# Patient Record
Sex: FEMALE | Race: WHITE | Employment: OTHER | URBAN - METROPOLITAN AREA
[De-identification: names, ages, dates, MRNs, and addresses within clinical notes are randomized per-mention and may not be internally consistent; named-entity substitution may affect disease eponyms.]

---

## 2018-09-03 ENCOUNTER — APPOINTMENT (OUTPATIENT)
Dept: GENERAL RADIOLOGY | Age: 68
DRG: 481 | End: 2018-09-03
Attending: EMERGENCY MEDICINE
Payer: MEDICARE

## 2018-09-03 ENCOUNTER — HOSPITAL ENCOUNTER (INPATIENT)
Age: 68
LOS: 3 days | Discharge: NURSING, REHAB OR LONG-TERM CARE | DRG: 481 | End: 2018-09-06
Attending: EMERGENCY MEDICINE | Admitting: ORTHOPAEDIC SURGERY
Payer: MEDICARE

## 2018-09-03 DIAGNOSIS — S72.322A CLOSED DISPLACED TRANSVERSE FRACTURE OF SHAFT OF LEFT FEMUR, INITIAL ENCOUNTER (HCC): Primary | ICD-10-CM

## 2018-09-03 PROBLEM — M81.0 OSTEOPOROSIS: Chronic | Status: ACTIVE | Noted: 2018-09-03

## 2018-09-03 PROBLEM — J45.909 ASTHMA: Chronic | Status: ACTIVE | Noted: 2018-09-03

## 2018-09-03 PROBLEM — E78.5 HYPERLIPIDEMIA: Chronic | Status: ACTIVE | Noted: 2018-09-03

## 2018-09-03 PROBLEM — S72.309A FRACTURE, FEMUR CLOSED, SHAFT (HCC): Status: ACTIVE | Noted: 2018-09-03

## 2018-09-03 LAB
ABO + RH BLD: NORMAL
ALBUMIN SERPL-MCNC: 3.8 G/DL (ref 3.5–5)
ALBUMIN/GLOB SERPL: 1.2 {RATIO} (ref 1.1–2.2)
ALP SERPL-CCNC: 88 U/L (ref 45–117)
ALT SERPL-CCNC: 28 U/L (ref 12–78)
ANION GAP SERPL CALC-SCNC: 9 MMOL/L (ref 5–15)
APPEARANCE UR: CLEAR
AST SERPL-CCNC: 16 U/L (ref 15–37)
BACTERIA URNS QL MICRO: NEGATIVE /HPF
BASOPHILS # BLD: 0 K/UL (ref 0–0.1)
BASOPHILS NFR BLD: 0 % (ref 0–1)
BILIRUB SERPL-MCNC: 0.5 MG/DL (ref 0.2–1)
BILIRUB UR QL: NEGATIVE
BLOOD GROUP ANTIBODIES SERPL: NORMAL
BUN SERPL-MCNC: 9 MG/DL (ref 6–20)
BUN/CREAT SERPL: 12 (ref 12–20)
CALCIUM SERPL-MCNC: 9.1 MG/DL (ref 8.5–10.1)
CHLORIDE SERPL-SCNC: 98 MMOL/L (ref 97–108)
CO2 SERPL-SCNC: 25 MMOL/L (ref 21–32)
COLOR UR: ABNORMAL
CREAT SERPL-MCNC: 0.75 MG/DL (ref 0.55–1.02)
DIFFERENTIAL METHOD BLD: NORMAL
EOSINOPHIL # BLD: 0 K/UL (ref 0–0.4)
EOSINOPHIL NFR BLD: 0 % (ref 0–7)
EPITH CASTS URNS QL MICRO: ABNORMAL /LPF
ERYTHROCYTE [DISTWIDTH] IN BLOOD BY AUTOMATED COUNT: 12.9 % (ref 11.5–14.5)
GLOBULIN SER CALC-MCNC: 3.1 G/DL (ref 2–4)
GLUCOSE SERPL-MCNC: 107 MG/DL (ref 65–100)
GLUCOSE UR STRIP.AUTO-MCNC: NEGATIVE MG/DL
HCT VFR BLD AUTO: 36.6 % (ref 35–47)
HGB BLD-MCNC: 12.5 G/DL (ref 11.5–16)
HGB UR QL STRIP: ABNORMAL
IMM GRANULOCYTES # BLD: 0 K/UL (ref 0–0.04)
IMM GRANULOCYTES NFR BLD AUTO: 0 % (ref 0–0.5)
INR PPP: 1 (ref 0.9–1.1)
KETONES UR QL STRIP.AUTO: ABNORMAL MG/DL
LEUKOCYTE ESTERASE UR QL STRIP.AUTO: NEGATIVE
LYMPHOCYTES # BLD: 1.1 K/UL (ref 0.8–3.5)
LYMPHOCYTES NFR BLD: 15 % (ref 12–49)
MCH RBC QN AUTO: 30.9 PG (ref 26–34)
MCHC RBC AUTO-ENTMCNC: 34.2 G/DL (ref 30–36.5)
MCV RBC AUTO: 90.6 FL (ref 80–99)
MONOCYTES # BLD: 0.7 K/UL (ref 0–1)
MONOCYTES NFR BLD: 9 % (ref 5–13)
NEUTS SEG # BLD: 5.5 K/UL (ref 1.8–8)
NEUTS SEG NFR BLD: 75 % (ref 32–75)
NITRITE UR QL STRIP.AUTO: NEGATIVE
NRBC # BLD: 0 K/UL (ref 0–0.01)
NRBC BLD-RTO: 0 PER 100 WBC
PH UR STRIP: 7.5 [PH] (ref 5–8)
PLATELET # BLD AUTO: 227 K/UL (ref 150–400)
PMV BLD AUTO: 10.2 FL (ref 8.9–12.9)
POTASSIUM SERPL-SCNC: 3.9 MMOL/L (ref 3.5–5.1)
PROT SERPL-MCNC: 6.9 G/DL (ref 6.4–8.2)
PROT UR STRIP-MCNC: NEGATIVE MG/DL
PROTHROMBIN TIME: 10 SEC (ref 9–11.1)
RBC # BLD AUTO: 4.04 M/UL (ref 3.8–5.2)
RBC #/AREA URNS HPF: ABNORMAL /HPF (ref 0–5)
SODIUM SERPL-SCNC: 132 MMOL/L (ref 136–145)
SP GR UR REFRACTOMETRY: 1.01 (ref 1–1.03)
SPECIMEN EXP DATE BLD: NORMAL
UA: UC IF INDICATED,UAUC: ABNORMAL
UROBILINOGEN UR QL STRIP.AUTO: 0.2 EU/DL (ref 0.2–1)
WBC # BLD AUTO: 7.4 K/UL (ref 3.6–11)
WBC URNS QL MICRO: ABNORMAL /HPF (ref 0–4)

## 2018-09-03 PROCEDURE — 74011250636 HC RX REV CODE- 250/636: Performed by: ORTHOPAEDIC SURGERY

## 2018-09-03 PROCEDURE — 77030005538 HC CATH URETH FOL44 BARD -B

## 2018-09-03 PROCEDURE — 65270000029 HC RM PRIVATE

## 2018-09-03 PROCEDURE — 74011250637 HC RX REV CODE- 250/637: Performed by: PHYSICIAN ASSISTANT

## 2018-09-03 PROCEDURE — 86900 BLOOD TYPING SEROLOGIC ABO: CPT | Performed by: EMERGENCY MEDICINE

## 2018-09-03 PROCEDURE — 99284 EMERGENCY DEPT VISIT MOD MDM: CPT

## 2018-09-03 PROCEDURE — 51702 INSERT TEMP BLADDER CATH: CPT

## 2018-09-03 PROCEDURE — 93005 ELECTROCARDIOGRAM TRACING: CPT

## 2018-09-03 PROCEDURE — 96374 THER/PROPH/DIAG INJ IV PUSH: CPT

## 2018-09-03 PROCEDURE — 74011250636 HC RX REV CODE- 250/636: Performed by: EMERGENCY MEDICINE

## 2018-09-03 PROCEDURE — 77030032490 HC SLV COMPR SCD KNE COVD -B

## 2018-09-03 PROCEDURE — 85025 COMPLETE CBC W/AUTO DIFF WBC: CPT | Performed by: EMERGENCY MEDICINE

## 2018-09-03 PROCEDURE — 77030028907 HC WRP KNEE WO BGS SOLM -B

## 2018-09-03 PROCEDURE — 74011250636 HC RX REV CODE- 250/636: Performed by: PHYSICIAN ASSISTANT

## 2018-09-03 PROCEDURE — 85610 PROTHROMBIN TIME: CPT | Performed by: EMERGENCY MEDICINE

## 2018-09-03 PROCEDURE — 36415 COLL VENOUS BLD VENIPUNCTURE: CPT | Performed by: EMERGENCY MEDICINE

## 2018-09-03 PROCEDURE — 81001 URINALYSIS AUTO W/SCOPE: CPT | Performed by: EMERGENCY MEDICINE

## 2018-09-03 PROCEDURE — 73552 X-RAY EXAM OF FEMUR 2/>: CPT

## 2018-09-03 PROCEDURE — 94761 N-INVAS EAR/PLS OXIMETRY MLT: CPT

## 2018-09-03 PROCEDURE — 96375 TX/PRO/DX INJ NEW DRUG ADDON: CPT

## 2018-09-03 PROCEDURE — 71045 X-RAY EXAM CHEST 1 VIEW: CPT

## 2018-09-03 PROCEDURE — 74011000250 HC RX REV CODE- 250: Performed by: EMERGENCY MEDICINE

## 2018-09-03 PROCEDURE — 80053 COMPREHEN METABOLIC PANEL: CPT | Performed by: EMERGENCY MEDICINE

## 2018-09-03 PROCEDURE — 94760 N-INVAS EAR/PLS OXIMETRY 1: CPT

## 2018-09-03 RX ORDER — CEFAZOLIN SODIUM/WATER 2 G/20 ML
2 SYRINGE (ML) INTRAVENOUS ONCE
Status: COMPLETED | OUTPATIENT
Start: 2018-09-03 | End: 2018-09-04

## 2018-09-03 RX ORDER — FLUTICASONE PROPIONATE 50 MCG
2 SPRAY, SUSPENSION (ML) NASAL DAILY
Status: DISCONTINUED | OUTPATIENT
Start: 2018-09-04 | End: 2018-09-06 | Stop reason: HOSPADM

## 2018-09-03 RX ORDER — ZOLEDRONIC ACID 5 MG/100ML
5 INJECTION, SOLUTION INTRAVENOUS ONCE
COMMUNITY
End: 2021-10-21 | Stop reason: CLARIF

## 2018-09-03 RX ORDER — MORPHINE SULFATE 2 MG/ML
4 INJECTION, SOLUTION INTRAMUSCULAR; INTRAVENOUS
Status: COMPLETED | OUTPATIENT
Start: 2018-09-03 | End: 2018-09-03

## 2018-09-03 RX ORDER — LORAZEPAM 2 MG/ML
1 INJECTION INTRAMUSCULAR
Status: DISCONTINUED | OUTPATIENT
Start: 2018-09-03 | End: 2018-09-06 | Stop reason: HOSPADM

## 2018-09-03 RX ORDER — LANOLIN ALCOHOL/MO/W.PET/CERES
400 CREAM (GRAM) TOPICAL DAILY
COMMUNITY
End: 2022-11-03 | Stop reason: CLARIF

## 2018-09-03 RX ORDER — ACETAMINOPHEN 325 MG/1
650 TABLET ORAL EVERY 6 HOURS
Status: DISCONTINUED | OUTPATIENT
Start: 2018-09-03 | End: 2018-09-04

## 2018-09-03 RX ORDER — HYDROMORPHONE HYDROCHLORIDE 2 MG/ML
.5-1 INJECTION, SOLUTION INTRAMUSCULAR; INTRAVENOUS; SUBCUTANEOUS
Status: DISCONTINUED | OUTPATIENT
Start: 2018-09-03 | End: 2018-09-06 | Stop reason: HOSPADM

## 2018-09-03 RX ORDER — FERROUS SULFATE, DRIED 160(50) MG
1 TABLET, EXTENDED RELEASE ORAL DAILY
Status: DISCONTINUED | OUTPATIENT
Start: 2018-09-04 | End: 2018-09-04 | Stop reason: SDUPTHER

## 2018-09-03 RX ORDER — PRAVASTATIN SODIUM 40 MG/1
40 TABLET ORAL
Status: DISCONTINUED | OUTPATIENT
Start: 2018-09-03 | End: 2018-09-06 | Stop reason: HOSPADM

## 2018-09-03 RX ORDER — NALOXONE HYDROCHLORIDE 0.4 MG/ML
0.4 INJECTION, SOLUTION INTRAMUSCULAR; INTRAVENOUS; SUBCUTANEOUS AS NEEDED
Status: DISCONTINUED | OUTPATIENT
Start: 2018-09-03 | End: 2018-09-04 | Stop reason: SDUPTHER

## 2018-09-03 RX ORDER — KETAMINE HYDROCHLORIDE 10 MG/ML
0.2 INJECTION, SOLUTION INTRAMUSCULAR; INTRAVENOUS ONCE
Status: COMPLETED | OUTPATIENT
Start: 2018-09-03 | End: 2018-09-03

## 2018-09-03 RX ORDER — HEPARIN SODIUM 5000 [USP'U]/ML
5000 INJECTION, SOLUTION INTRAVENOUS; SUBCUTANEOUS ONCE
Status: COMPLETED | OUTPATIENT
Start: 2018-09-03 | End: 2018-09-03

## 2018-09-03 RX ORDER — IBUPROFEN 400 MG/1
400 TABLET ORAL EVERY 12 HOURS
COMMUNITY
End: 2021-10-21 | Stop reason: CLARIF

## 2018-09-03 RX ORDER — HYDRALAZINE HYDROCHLORIDE 20 MG/ML
10 INJECTION INTRAMUSCULAR; INTRAVENOUS
Status: DISCONTINUED | OUTPATIENT
Start: 2018-09-03 | End: 2018-09-06 | Stop reason: HOSPADM

## 2018-09-03 RX ORDER — ONDANSETRON 2 MG/ML
4 INJECTION INTRAMUSCULAR; INTRAVENOUS ONCE
Status: DISPENSED | OUTPATIENT
Start: 2018-09-03 | End: 2018-09-04

## 2018-09-03 RX ORDER — MONTELUKAST SODIUM 10 MG/1
10 TABLET ORAL DAILY
Status: DISCONTINUED | OUTPATIENT
Start: 2018-09-04 | End: 2018-09-06 | Stop reason: HOSPADM

## 2018-09-03 RX ORDER — MELATONIN
1000 DAILY
Status: DISCONTINUED | OUTPATIENT
Start: 2018-09-04 | End: 2018-09-06 | Stop reason: HOSPADM

## 2018-09-03 RX ORDER — SODIUM CHLORIDE 0.9 % (FLUSH) 0.9 %
5-10 SYRINGE (ML) INJECTION AS NEEDED
Status: DISCONTINUED | OUTPATIENT
Start: 2018-09-03 | End: 2018-09-05

## 2018-09-03 RX ORDER — FLUTICASONE FUROATE AND VILANTEROL 100; 25 UG/1; UG/1
1 POWDER RESPIRATORY (INHALATION) DAILY
Status: DISCONTINUED | OUTPATIENT
Start: 2018-09-04 | End: 2018-09-06 | Stop reason: HOSPADM

## 2018-09-03 RX ORDER — FLUTICASONE FUROATE AND VILANTEROL 100; 25 UG/1; UG/1
1 POWDER RESPIRATORY (INHALATION) DAILY
COMMUNITY

## 2018-09-03 RX ORDER — HYDROMORPHONE HYDROCHLORIDE 1 MG/ML
.5-1 INJECTION, SOLUTION INTRAMUSCULAR; INTRAVENOUS; SUBCUTANEOUS
Status: DISCONTINUED | OUTPATIENT
Start: 2018-09-03 | End: 2018-09-03

## 2018-09-03 RX ORDER — SODIUM CHLORIDE 9 MG/ML
75 INJECTION, SOLUTION INTRAVENOUS CONTINUOUS
Status: DISCONTINUED | OUTPATIENT
Start: 2018-09-03 | End: 2018-09-04 | Stop reason: SDUPTHER

## 2018-09-03 RX ORDER — AMOXICILLIN 250 MG
2 CAPSULE ORAL 2 TIMES DAILY
Status: DISCONTINUED | OUTPATIENT
Start: 2018-09-03 | End: 2018-09-04 | Stop reason: SDUPTHER

## 2018-09-03 RX ORDER — OXYCODONE HYDROCHLORIDE 5 MG/1
5-10 TABLET ORAL
Status: DISCONTINUED | OUTPATIENT
Start: 2018-09-03 | End: 2018-09-06 | Stop reason: HOSPADM

## 2018-09-03 RX ORDER — SODIUM CHLORIDE 0.9 % (FLUSH) 0.9 %
5-10 SYRINGE (ML) INJECTION EVERY 8 HOURS
Status: DISCONTINUED | OUTPATIENT
Start: 2018-09-03 | End: 2018-09-05

## 2018-09-03 RX ORDER — IPRATROPIUM BROMIDE AND ALBUTEROL SULFATE 2.5; .5 MG/3ML; MG/3ML
3 SOLUTION RESPIRATORY (INHALATION)
Status: DISCONTINUED | OUTPATIENT
Start: 2018-09-03 | End: 2018-09-06 | Stop reason: HOSPADM

## 2018-09-03 RX ADMIN — HEPARIN SODIUM 5000 UNITS: 5000 INJECTION INTRAVENOUS; SUBCUTANEOUS at 20:00

## 2018-09-03 RX ADMIN — ACETAMINOPHEN 650 MG: 325 TABLET ORAL at 18:51

## 2018-09-03 RX ADMIN — PRAVASTATIN SODIUM 40 MG: 40 TABLET ORAL at 20:00

## 2018-09-03 RX ADMIN — MORPHINE SULFATE 4 MG: 2 INJECTION, SOLUTION INTRAMUSCULAR; INTRAVENOUS at 14:56

## 2018-09-03 RX ADMIN — SODIUM CHLORIDE 75 ML/HR: 900 INJECTION, SOLUTION INTRAVENOUS at 18:56

## 2018-09-03 RX ADMIN — KETAMINE HYDROCHLORIDE 17.1 MG: 10 INJECTION, SOLUTION INTRAMUSCULAR; INTRAVENOUS at 16:35

## 2018-09-03 RX ADMIN — SENNOSIDES AND DOCUSATE SODIUM 2 TABLET: 8.6; 5 TABLET ORAL at 18:51

## 2018-09-03 RX ADMIN — Medication 10 ML: at 18:52

## 2018-09-03 RX ADMIN — HYDROMORPHONE HYDROCHLORIDE 1 MG: 2 INJECTION, SOLUTION INTRAMUSCULAR; INTRAVENOUS; SUBCUTANEOUS at 23:09

## 2018-09-03 NOTE — ED NOTES
Transport tech at bedside. Patient condition stable, respiratory status within normal limits, neuro status intact.

## 2018-09-03 NOTE — ROUTINE PROCESS
TRANSFER - OUT REPORT:    Verbal report given to Hernando Florez RN on [place]  being transferred to [address] [place] [address] for routine progression of care       Report consisted of patients Situation, Background, Assessment and   Recommendations(SBAR). Information from the following report(s) SBAR and ED Summary was reviewed with the receiving nurse. Lines:   Peripheral IV 09/03/18 Left Antecubital (Active)   Site Assessment Clean, dry, & intact 9/3/2018  3:09 PM   Phlebitis Assessment 0 9/3/2018  3:09 PM   Infiltration Assessment 0 9/3/2018  3:09 PM   Dressing Status Clean, dry, & intact 9/3/2018  3:09 PM   Dressing Type Transparent 9/3/2018  3:09 PM        Opportunity for questions and clarification was provided.       Patient transported with:   [facility]

## 2018-09-03 NOTE — H&P
ORTHOPAEDIC H&P    Subjective:     Date of Consultation:  September 3, 2018      Mariel Burrows is a 70 y.o. female who is being seen for left femur fracture. Pt reports tripping over a tree root while walking in the yard. Describes terrible pain, deformity, requiring EMS to straighten and put in traction. Pt state she is comfortable in her current position. Ambulates at baseline unassisted. Patient Active Problem List    Diagnosis Date Noted    Fracture, femur closed, shaft ([facility] 09/03/2018     No family history on file. Social History   Substance Use Topics    Smoking status: Never Smoker    Smokeless tobacco: Never Used    Alcohol use No     Past Medical History:   Diagnosis Date    Asthma     Cancer ([facility]     skin      Past Surgical History:   Procedure Laterality Date    COLONOSCOPY N/A 7/26/2016    COLONOSCOPY performed by Julian [facility] at [facility] ENDOSCOPY    HX OTHER SURGICAL      exc skin ca      Prior to Admission medications    Medication Sig Start Date End Date Taking? Authorizing Provider   fluticasone-vilanterol (BREO ELLIPTA) 100-25 mcg/dose inhaler Take 1 Puff by inhalation daily. Yes Karan Rogers MD   zoledronic acid (RECLAST) 5 mg/100 mL pgbk 5 mg by IntraVENous route once. Once a year infusion   Yes Karan Rogers MD   vitamin E, dl,tocopheryl acet, (VITAMIN E ACETATE) 400 unit cap capsule Take 400 Units by mouth daily. Yes Karan Rogers MD   ibuprofen (MOTRIN) 400 mg tablet Take 400 mg by mouth every twelve (12) hours. Yes Karan Rogers MD   montelukast (SINGULAIR) 10 mg tablet Take 10 mg by mouth daily. Yes Historical Provider   simvastatin (ZOCOR) 20 mg tablet Take  by mouth nightly. Yes Historical Provider   Cetirizine 10 mg cap Take  by mouth daily. Yes Historical Provider   mometasone (NASONEX) 50 mcg/actuation nasal spray 2 Sprays daily. Yes Historical Provider   cholecalciferol (VITAMIN D3) 1,000 unit tablet Take  by mouth daily.    Yes Historical Provider multivitamin (ONE A DAY) tablet Take 1 Tab by mouth daily. Yes Historical Provider   calcium-cholecalciferol, d3, 600-125 mg-unit tab Take  by mouth daily. Yes Historical Provider     Current Facility-Administered Medications   Medication Dose Route Frequency    0.9% sodium chloride infusion  75 mL/hr IntraVENous CONTINUOUS    sodium chloride (NS) flush 5-10 mL  5-10 mL IntraVENous Q8H    sodium chloride (NS) flush 5-10 mL  5-10 mL IntraVENous PRN    acetaminophen (TYLENOL) tablet 650 mg  650 mg Oral Q6H    oxyCODONE IR (ROXICODONE) tablet 5-10 mg  5-10 mg Oral Q4H PRN    naloxone (NARCAN) injection 0.4 mg  0.4 mg IntraVENous PRN    ondansetron (ZOFRAN) injection 4 mg  4 mg IntraVENous ONCE    senna-docusate (PERICOLACE) 8.6-50 mg per tablet 2 Tab  2 Tab Oral BID    LORazepam (ATIVAN) injection 1 mg  1 mg IntraVENous Q4H PRN    HYDROmorphone (PF) (DILAUDID) injection 0.5-1 mg  0.5-1 mg IntraVENous Q2H PRN     Current Outpatient Prescriptions   Medication Sig    fluticasone-vilanterol (BREO ELLIPTA) 100-25 mcg/dose inhaler Take 1 Puff by inhalation daily.  zoledronic acid (RECLAST) 5 mg/100 mL pgbk 5 mg by IntraVENous route once. Once a year infusion    vitamin E, dl,tocopheryl acet, (VITAMIN E ACETATE) 400 unit cap capsule Take 400 Units by mouth daily.  ibuprofen (MOTRIN) 400 mg tablet Take 400 mg by mouth every twelve (12) hours.  montelukast (SINGULAIR) 10 mg tablet Take 10 mg by mouth daily.  simvastatin (ZOCOR) 20 mg tablet Take  by mouth nightly.  Cetirizine 10 mg cap Take  by mouth daily.  mometasone (NASONEX) 50 mcg/actuation nasal spray 2 Sprays daily.  cholecalciferol (VITAMIN D3) 1,000 unit tablet Take  by mouth daily.  multivitamin (ONE A DAY) tablet Take 1 Tab by mouth daily.  calcium-cholecalciferol, d3, 600-125 mg-unit tab Take  by mouth daily.       No Known Allergies     Review of Systems:  A comprehensive review of systems was negative except for that written in the HPI. Mental Status: no dementia    Objective:     Patient Vitals for the past 8 hrs:   BP Temp Pulse Resp SpO2 Height Weight   18 1645 173/76 - 86 19 100 % - -   18 1637 161/77 - 80 18 100 % - -   18 1500 170/69 - - - 100 % - -   18 1449 187/64 97.5 °F (36.4 °C) 79 18 100 % 5' 5.5\" (1.664 m) 85.3 kg (188 lb)     Temp (24hrs), Av.5 °F (36.4 °C), Min:97.5 °F (36.4 °C), Max:97.5 °F (36.4 °C)      Gen: Well-developed,  Uncomfortable, obese    HEENT: Pink conjunctivae, hearing intact to voice, moist mucous membranes   Neck: Supple  Resp: No respiratory distress   Card: RRR, palpable distal pulse-equal bilaterally, birsk cap refill all distal digits   Abd: Soft, non-distended  Musc: left thigh with obvious deformity,   Neurovascular exam intact LLE, 5/5 MMT gastroc/ant tib/EHL/FHL, No sign of LE VTE  Skin: No skin breakdown noted. Skin warm, pink, dry  Neuro: Cranial nerves are grossly intact, no focal motor weakness, follows commands appropriately   Psych: Good insight, oriented to person, place and time, alert    Imaging Review: XR FEMUR LT 2 V  COMPARISON: None. FINDINGS: 2 views of the left femur were obtained. Comminuted displaced fracture  of the mid to distal femoral diaphysis with approximately 5 cm of  foreshortening, and apex and medial angulation. Alignment at the left hip joint  is preserved. Alignment at the left knee joint also appears preserved. No  additional fractures are identified. IMPRESSION:  Comminuted displaced acute fracture of the mid to distal femoral  diaphysis with foreshortening and apex medial angulation.     Labs:   Recent Results (from the past 24 hour(s))   CBC WITH AUTOMATED DIFF    Collection Time: 18  2:58 PM   Result Value Ref Range    WBC 7.4 3.6 - 11.0 K/uL    RBC 4.04 3.80 - 5.20 M/uL    HGB 12.5 11.5 - 16.0 g/dL    HCT 36.6 35.0 - 47.0 %    MCV 90.6 80.0 - 99.0 FL    MCH 30.9 26.0 - 34.0 PG    MCHC 34.2 30.0 - 36.5 g/dL    RDW 12.9 11.5 - 14.5 %    PLATELET 646 181 - 913 K/uL    MPV 10.2 8.9 - 12.9 FL    NRBC 0.0 0  WBC    ABSOLUTE NRBC 0.00 0.00 - 0.01 K/uL    NEUTROPHILS 75 32 - 75 %    LYMPHOCYTES 15 12 - 49 %    MONOCYTES 9 5 - 13 %    EOSINOPHILS 0 0 - 7 %    BASOPHILS 0 0 - 1 %    IMMATURE GRANULOCYTES 0 0.0 - 0.5 %    ABS. NEUTROPHILS 5.5 1.8 - 8.0 K/UL    ABS. LYMPHOCYTES 1.1 0.8 - 3.5 K/UL    ABS. MONOCYTES 0.7 0.0 - 1.0 K/UL    ABS. EOSINOPHILS 0.0 0.0 - 0.4 K/UL    ABS. BASOPHILS 0.0 0.0 - 0.1 K/UL    ABS. IMM. GRANS. 0.0 0.00 - 0.04 K/UL    DF AUTOMATED     METABOLIC PANEL, COMPREHENSIVE    Collection Time: 09/03/18  2:58 PM   Result Value Ref Range    Sodium 132 (L) 136 - 145 mmol/L    Potassium 3.9 3.5 - 5.1 mmol/L    Chloride 98 97 - 108 mmol/L    CO2 25 21 - 32 mmol/L    Anion gap 9 5 - 15 mmol/L    Glucose 107 (H) 65 - 100 mg/dL    BUN 9 6 - 20 MG/DL    Creatinine 0.75 0.55 - 1.02 MG/DL    BUN/Creatinine ratio 12 12 - 20      GFR est AA >60 >60 ml/min/1.73m2    GFR est non-AA >60 >60 ml/min/1.73m2    Calcium 9.1 8.5 - 10.1 MG/DL    Bilirubin, total 0.5 0.2 - 1.0 MG/DL    ALT (SGPT) 28 12 - 78 U/L    AST (SGOT) 16 15 - 37 U/L    Alk.  phosphatase 88 45 - 117 U/L    Protein, total 6.9 6.4 - 8.2 g/dL    Albumin 3.8 3.5 - 5.0 g/dL    Globulin 3.1 2.0 - 4.0 g/dL    A-G Ratio 1.2 1.1 - 2.2     PROTHROMBIN TIME + INR    Collection Time: 09/03/18  2:58 PM   Result Value Ref Range    INR 1.0 0.9 - 1.1      Prothrombin time 10.0 9.0 - 11.1 sec   TYPE & SCREEN    Collection Time: 09/03/18  2:58 PM   Result Value Ref Range    Crossmatch Expiration 09/06/2018     ABO/Rh(D) Nazia Martínez POSITIVE     Antibody screen NEG    EKG, 12 LEAD, INITIAL    Collection Time: 09/03/18  4:30 PM   Result Value Ref Range    Ventricular Rate 77 BPM    Atrial Rate 77 BPM    P-R Interval 168 ms    QRS Duration 86 ms    Q-T Interval 368 ms    QTC Calculation (Bezet) 416 ms    Calculated P Axis 52 degrees    Calculated R Axis 69 degrees    Calculated T Axis 66 degrees    Diagnosis       Normal sinus rhythm  Normal ECG  No previous ECGs available           Impression:     Patient Active Problem List    Diagnosis Date Noted    Fracture, femur closed, shaft ([facility] 09/03/2018     Active Problems:    Fracture, femur closed, shaft ([facility] (9/3/2018)        Plan:     Left femoral shaft fracture  -  Plan for ORIF at the earliest convenience   -  Medicine for Pre-Operative Clearence/ Post-Operative management.     -  DVT Prophylaxis - SCDs  -  D/C planning- SNF/Rehab/HH      Dr. Kaylee Diaz aware and agrees with plan as above.         Alicia You PA-C  [employer]

## 2018-09-03 NOTE — IP AVS SNAPSHOT
[address] [facility] 
[phone] Patient: Soni Judge MRN: [id number] [id number] About your hospitalization You were admitted on:  September 3, 2018 You last received care in the:  [facility] You were discharged on:  September 6, 2018 Why you were hospitalized Your primary diagnosis was:  Not on File Your diagnoses also included:  Fracture, Femur Closed, Shaft (Hcc), Hyperlipidemia, Osteoporosis, Asthma Follow-up Information Follow up With Details Comments Contact Info [address]  This is your post acute care provider  [address] [facility] [address] [facility] 
[phone] Ethan Casillas MD   [address] [address] 
[phone] Discharge Orders None A check ciaran indicates which time of day the medication should be taken. My Medications START taking these medications Instructions Each Dose to Equal  
 Morning Noon Evening Bedtime  
 aspirin delayed-release 325 mg tablet Your last dose was: Your next dose is: Take 1 Tab by mouth every twelve (12) hours. 325 mg  
    
   
   
   
  
 oxyCODONE IR 5 mg immediate release tablet Commonly known as:  Keisha Yuliana Your last dose was: Your next dose is: Take 1 Tab by mouth every four (4) hours as needed. Max Daily Amount: 30 mg.  
 5 mg CONTINUE taking these medications Instructions Each Dose to Equal  
 Morning Noon Evening Bedtime BREO ELLIPTA 100-25 mcg/dose inhaler Generic drug:  fluticasone-vilanterol Your last dose was: Your next dose is: Take 1 Puff by inhalation daily. 1 Puff  
    
   
   
   
  
 calcium-cholecalciferol (d3) 600-125 mg-unit Tab Your last dose was: Your next dose is: Take  by mouth daily. Cetirizine 10 mg Cap Your last dose was: Your next dose is: Take  by mouth daily. cholecalciferol 1,000 unit tablet Commonly known as:  VITAMIN D3 Your last dose was: Your next dose is: Take  by mouth daily. ibuprofen 400 mg tablet Commonly known as:  MOTRIN Your last dose was: Your next dose is: Take 400 mg by mouth every twelve (12) hours. 400 mg  
    
   
   
   
  
 mometasone 50 mcg/actuation nasal spray Commonly known as:  Hulon Fu Your last dose was: Your next dose is: 2 Sprays daily. 2 Spray  
    
   
   
   
  
 montelukast 10 mg tablet Commonly known as:  SINGULAIR Your last dose was: Your next dose is: Take 10 mg by mouth daily. 10 mg  
    
   
   
   
  
 multivitamin tablet Commonly known as:  ONE A DAY Your last dose was: Your next dose is: Take 1 Tab by mouth daily. 1 Tab RECLAST 5 mg/100 mL Pgbk Generic drug:  zoledronic acid Your last dose was: Your next dose is:    
   
   
 5 mg by IntraVENous route once. Once a year infusion 5 mg  
    
   
   
   
  
 simvastatin 20 mg tablet Commonly known as:  ZOCOR Your last dose was: Your next dose is: Take  by mouth nightly. vitamin E acetate 400 unit Cap capsule Your last dose was: Your next dose is: Take 400 Units by mouth daily. 400 Units Where to Get Your Medications These medications were sent to [facility], [address]  [address] [place] [address],  [place] [address] Phone:  [phone]  
  aspirin delayed-release 325 mg tablet Information on where to get these meds will be given to you by the nurse or doctor. ! Ask your nurse or doctor about these medications  
  oxyCODONE IR 5 mg immediate release tablet Opioid Education Prescription Opioids: What You Need to Know: 
 
Prescription opioids can be used to help relieve moderate-to-severe pain and are often prescribed following a surgery or injury, or for certain health conditions. These medications can be an important part of treatment but also come with serious risks. Opioids are strong pain medicines. Examples include hydrocodone, oxycodone, fentanyl, and morphine. Heroin is an example of an illegal opioid. It is important to work with your health care provider to make sure you are getting the safest, most effective care. WHAT ARE THE RISKS AND SIDE EFFECTS OF OPIOID USE? Prescription opioids carry serious risks of addiction and overdose, especially with prolonged use. An opioid overdose, often marked by slow breathing, can cause sudden death. The use of prescription opioids can have a number of side effects as well, even when taken as directed. · Tolerance-meaning you might need to take more of a medication for the same pain relief · Physical dependence-meaning you have symptoms of withdrawal when the medication is stopped. Withdrawal symptoms can include nausea, sweating, chills, diarrhea, stomach cramps, and muscle aches. Withdrawal can last up to several weeks, depending on which drug you took and how long you took it. · Increased sensitivity to pain · Constipation · Nausea, vomiting, and dry mouth · Sleepiness and dizziness · Confusion · Depression · Low levels of testosterone that can result in lower sex drive, energy, and strength · Itching and sweating RISKS ARE GREATER WITH:      
· History of drug misuse, substance use disorder, or overdose · Mental health conditions (such as depression or anxiety) · Sleep apnea · Older age (72 years or older) · Pregnancy Avoid alcohol while taking prescription opioids. Also, unless specifically advised by your health care provider, medications to avoid include: · Benzodiazepines (such as Xanax or Valium) · Muscle relaxants (such as Soma or Flexeril) · Hypnotics (such as Ambien or Lunesta) · Other prescription opioids KNOW YOUR OPTIONS Talk to your health care provider about ways to manage your pain that don't involve prescription opioids. Some of these options may actually work better and have fewer risks and side effects. Options may include: 
· Pain relievers such as acetaminophen, ibuprofen, and naproxen · Some medications that are also used for depression or seizures · Physical therapy and exercise · Counseling to help patients learn how to cope better with triggers of pain and stress. · Application of heat or cold compress · Massage therapy · Relaxation techniques Be Informed Make sure you know the name of your medication, how much and how often to take it, and its potential risks & side effects. IF YOU ARE PRESCRIBED OPIOIDS FOR PAIN: 
· Never take opioids in greater amounts or more often than prescribed. Remember the goal is not to be pain-free but to manage your pain at a tolerable level. · Follow up with your primary care provider to: · Work together to create a plan on how to manage your pain. · Talk about ways to help manage your pain that don't involve prescription opioids. · Talk about any and all concerns and side effects. · Help prevent misuse and abuse. · Never sell or share prescription opioids · Help prevent misuse and abuse. · Store prescription opioids in a secure place and out of reach of others (this may include visitors, children, friends, and family).  
· Safely dispose of unused/unwanted prescription opioids: Find your community drug take-back program or your pharmacy mail-back program, or flush them down the toilet, following guidance from the Food and Drug Administration (www.fda.gov/Drugs/ResourcesForYou). · Visit www.cdc.gov/drugoverdose to learn about the risks of opioid abuse and overdose. · If you believe you may be struggling with addiction, tell your health care provider and ask for guidance or call [employer] at 1-026-765-HELP. Discharge Instructions Follow up appointments Call Cesilia Mckinney at [phone] to schedule follow up appt with Dr. William Liu in  10 - 14 days. When to call your Orthopaedic Surgeon: 
-unrelieved pain 
-Signs of infection-if your incision is red; continues to have drainage; drainage has a foul odor or if you have a persistent fever over 101 degrees 
-Signs of a blood clot in your leg-calf pain, tenderness, redness, swelling of lower leg When to call your Primary Care Physician: 
-Concerns about medical conditions such as diabetes, high blood pressure, asthma, congestive heart failure 
-Call if blood sugars are elevated, persistent headache or dizziness, coughing or congestion, constipation or diarrhea, burning with urination, abnormal heart rate When to call [id number] go to the nearest emergency room 
-acute onset of chest pain, shortness of breath, difficulty breathing Activity- toe touch weight bearing Incision Care- keep clean and dry Preventing blood clots- asa 325mg bid Pain management 
-take pain medication as prescribed; decrease the amount you use as your pain lessens 
-avoid alcoholic beverages while taking pain medication 
-Please be aware that many medications contain Tylenol. We do not want you to over medicate so please read the information below as a guide. Do not take more than 4 Grams of Tylenol in a 24 hour period. (There are 1000 milligrams in one Gram) Percocet contains 325 mg of Tylenol per tablet (do not take more than 12 tablets in 24 hours) Lortab contains 500 mg of Tylenol per tablet (do not take more than 8 tablets in 24 hours) Norco contains 325 mg of Tylenol per tablet (do not take more than 12 tablets in 24 hours). -You may place an ice bag on your affected extremity Diet 
-resume usual diet; drink plenty of fluids; eat foods high in fiber 
-you may want to take a stool softener (such as Senokot-S or Colace) to prevent constipation while you are taking pain medication. If constipation occurs, take a laxative (such as Dulcolax tablets, Milk of Magnesia, or a suppository) Introducing [facility]! [facility] introduces [employer] (IMMI) patient portal. Now you can access parts of your medical record, email your doctor's office, and request medication refills online. 1. In your internet browser, go to [email or web address] 2. Click on the First Time User? Click Here link in the Sign In box. You will see the New Member Sign Up page. 3. Enter your [employer] Corpt Access Code exactly as it appears below. You will not need to use this code after youve completed the sign-up process. If you do not sign up before the expiration date, you must request a new code. · [employer] Cor[employer] Access Code: [facility] Expires: 12/2/2018  2:47 PM 
 
4. Enter the last four digits of your Social Security Number (xxxx) and Date of Birth (mm/dd/yyyy) as indicated and click Submit. You will be taken to the next sign-up page. 5. Create a [employer] Corpt ID. This will be your [employer] login ID and cannot be changed, so think of one that is secure and easy to remember. 6. Create a [employer] password. You can change your password at any time. 7. Enter your Password Reset Question and Answer. This can be used at a later time if you forget your password. 8. Enter your e-mail address. You will receive e-mail notification when new information is available in [address] 9. Click Sign Up. You can now view and download portions of your medical record. 10. Click the Download Summary menu link to download a portable copy of your medical information. If you have questions, please visit the Frequently Asked Questions section of the [employer] website. Remember, [employer] is NOT to be used for urgent needs. For medical emergencies, dial 911. Now available from your iPhone and Android! Introducing John Gary As a [facility] patient, I wanted to make you aware of our electronic visit tool called John Gary. [employer] allows you to connect within minutes with a medical provider 24 hours a day, seven days a week via a mobile device or tablet or logging into a secure website from your computer. You can access John Gary from anywhere in the [place]. A virtual visit might be right for you when you have a simple condition and feel like you just dont want to get out of bed, or cant get away from work for an appointment, when your regular [facility] provider is not available (evenings, weekends or holidays), or when youre out of town and need minor care. Electronic visits cost only $49 and if the [employer] provider determines a prescription is needed to treat your condition, one can be electronically transmitted to a nearby pharmacy*. Please take a moment to enroll today if you have not already done so. The enrollment process is free and takes just a few minutes. To enroll, please download the [employer] dennis to your tablet or phone, or visit [email or web address] to enroll on your computer. And, as an [address] [place] [address] patient with a [employer] account, the results of your visits will be scanned into your electronic medical record and your primary care provider will be able to view the scanned results.    
We urge you to continue to see your regular [facility] provider for your ongoing medical care. And while your primary care provider may not be the one available when you seek a John Crainlesleefin virtual visit, the peace of mind you get from getting a real diagnosis real time can be priceless. For more information on John Crainlesleefin, view our Frequently Asked Questions (FAQs) at [email or web address]. Sincerely, 
 
Valentino Milks, MD 
Chief Medical Officer Mirna Love *:  certain medications cannot be prescribed via John Abdirahman Providers Seen During Your Hospitalization Provider Specialty Primary office phone Baltazar Ma MD Emergency Medicine [phone] [facility], [address] [place] [address] Orthopedic Surgery [phone] Your Primary Care Physician (PCP) Primary Care Physician Office Phone Office Fax Grayson Padilla [phone] You are allergic to the following No active allergies Recent Documentation Height Weight BMI OB Status Smoking Status 1.664 m 89.4 kg 32.28 kg/m2 Postmenopausal Never Smoker Emergency Contacts Name Discharge Info Relation Home Work Mobile CastilloSavanna DISCHARGE CAREGIVER [3] Sister [23] [phone] Patient Belongings The following personal items are in your possession at time of discharge: 
  Dental Appliances: None  Visual Aid: Glasses      Home Medications: None   Jewelry: None  Clothing: None    Other Valuables: None  Personal Items Sent to Safe: na 
 
  
  
 Please provide this summary of care documentation to your next provider. Signatures-by signing, you are acknowledging that this After Visit Summary has been reviewed with you and you have received a copy. Patient Signature:  ____________________________________________________________ Date:  ____________________________________________________________  
  
Jennifer Currie  Provider Signature: ____________________________________________________________ Date:  ____________________________________________________________

## 2018-09-03 NOTE — ED NOTES
Assumed care of patient. Bedside shift change report received from [facility] (offgoing nurse) by Daphnie Casillas (oncoming nurse). Given using SBAR, ED summary, MAR and recent results. Pt resting in position of comfort. Pedal pulses marked on bilateral feet.  Pt made aware to be NPO at this time, awaiting for xray

## 2018-09-03 NOTE — ED PROVIDER NOTES
EMERGENCY DEPARTMENT HISTORY AND PHYSICAL EXAM      Date: 9/3/2018  Patient Name: Álvaro Fang    History of Presenting Illness     Chief Complaint   Patient presents with   Miah Kelley     tripped over a root while outside    Leg Injury     left thigh deformity; rescue has pt in traction       History Provided By: Patient and EMS    HPI: Álvaro Fang, 70 y.o. female with PMHx significant for asthma, presents via EMS to the ED with cc of an acute onset, L leg pain PTA today. She denies any associated symptoms. EMS reports the pt was out doing yard work, when she had tripped over a tree root, causing her to fall and land on her L side. They report a gross deformity to her L leg and upon arrival, the family was found supporting her leg. EMS informs of good peripheral sensations and had given the pt 2 doses of 100 mcg fentanyl, which had improved her pain to a 5/10. She denies seeing an orthopedic specialist in the past. Pt denies any known allergies to medication. She denies the use of any anticoagulant. There are no other complaints, changes, or physical findings at this time. PCP: Simone Carter MD    Current Outpatient Prescriptions   Medication Sig Dispense Refill    fluticasone-vilanterol (BREO ELLIPTA) 100-25 mcg/dose inhaler Take 1 Puff by inhalation daily.  zoledronic acid (RECLAST) 5 mg/100 mL pgbk 5 mg by IntraVENous route once. Once a year infusion      vitamin E, dl,tocopheryl acet, (VITAMIN E ACETATE) 400 unit cap capsule Take 400 Units by mouth daily.  ibuprofen (MOTRIN) 400 mg tablet Take 400 mg by mouth every twelve (12) hours.  montelukast (SINGULAIR) 10 mg tablet Take 10 mg by mouth daily.  simvastatin (ZOCOR) 20 mg tablet Take  by mouth nightly.  Cetirizine 10 mg cap Take  by mouth daily.  mometasone (NASONEX) 50 mcg/actuation nasal spray 2 Sprays daily.  cholecalciferol (VITAMIN D3) 1,000 unit tablet Take  by mouth daily.       multivitamin (ONE A DAY) tablet Take 1 Tab by mouth daily.  calcium-cholecalciferol, d3, 600-125 mg-unit tab Take  by mouth daily. Past History     Past Medical History:  Past Medical History:   Diagnosis Date    Asthma     Cancer [facility])     skin       Past Surgical History:  Past Surgical History:   Procedure Laterality Date    COLONOSCOPY N/A 7/26/2016    COLON[facility]COPY performed [facility] Alie [facility]her [facility] at [facility] ENDOSCOPY    HX OTHER SURGICAL      exc skin ca       Family History:  No family history on file. Social History:  Social History   Substance Use Topics    Smoking status: Never Smoker    Smokeless tobacco: Never Used    Alcohol use No       Allergies:  No Known Allergies      Review of Systems   Review of Systems   Constitutional: Negative for chills and fever. Respiratory: Negative for cough and shortness of breath. Cardiovascular: Negative for chest pain. Gastrointestinal: Negative for constipation, diarrhea, nausea and vomiting. Musculoskeletal: Positive for myalgias (+LLE). Neurological: Negative for weakness and numbness. All other systems reviewed and are negative. Physical Exam   Physical Exam   Constitutional: She is oriented to person, place, and time. She appears well-developed and well-nourished. Moderate distress secondary to pain   HENT:   Head: Normocephalic and atraumatic. Eyes: Conjunctivae and EOM are normal.   Neck: Normal range of motion. Neck supple. Cardiovascular: Normal rate and regular rhythm. Pulmonary/Chest: Effort normal and breath sounds normal. No respiratory distress. Abdominal: Soft. She exhibits no distension. There is no tenderness. Musculoskeletal: She exhibits deformity. L leg:  Obvious deformity  Externally rotated and shortened  Significant swelling to the thigh with pain elicited on minimal movement  2+ DP pulses  Able to wiggle toes  Good sensation of foot   Neurological: She is alert and oriented to person, place, and time.    Skin: Skin is warm and dry. Psychiatric: She has a normal mood and affect. Nursing note and vitals reviewed. Diagnostic Study Results     Labs -   Recent Results (from the past 12 hour(s))   CBC WITH AUTOMATED DIFF    Collection Time: 09/03/18  2:58 PM   Result Value Ref Range    WBC 7.4 3.6 - 11.0 K/uL    RBC 4.04 3.80 - 5.20 M/uL    HGB 12.5 11.5 - 16.0 g/dL    HCT 36.6 35.0 - 47.0 %    MCV 90.6 80.0 - 99.0 FL    MCH 30.9 26.0 - 34.0 PG    MCHC 34.2 30.0 - 36.5 g/dL    RDW 12.9 11.5 - 14.5 %    PLATELET 483 421 - 230 K/uL    MPV 10.2 8.9 - 12.9 FL    NRBC 0.0 0  WBC    ABSOLUTE NRBC 0.00 0.00 - 0.01 K/uL    NEUTROPHILS 75 32 - 75 %    LYMPHOCYTES 15 12 - 49 %    MONOCYTES 9 5 - 13 %    EOSINOPHILS 0 0 - 7 %    BASOPHILS 0 0 - 1 %    IMMATURE GRANULOCYTES 0 0.0 - 0.5 %    ABS. NEUTROPHILS 5.5 1.8 - 8.0 K/UL    ABS. LYMPHOCYTES 1.1 0.8 - 3.5 K/UL    ABS. MONOCYTES 0.7 0.0 - 1.0 K/UL    ABS. EOSINOPHILS 0.0 0.0 - 0.4 K/UL    ABS. BASOPHILS 0.0 0.0 - 0.1 K/UL    ABS. IMM. GRANS. 0.0 0.00 - 0.04 K/UL    DF AUTOMATED     METABOLIC PANEL, COMPREHENSIVE    Collection Time: 09/03/18  2:58 PM   Result Value Ref Range    Sodium 132 (L) 136 - 145 mmol/L    Potassium 3.9 3.5 - 5.1 mmol/L    Chloride 98 97 - 108 mmol/L    CO2 25 21 - 32 mmol/L    Anion gap 9 5 - 15 mmol/L    Glucose 107 (H) 65 - 100 mg/dL    BUN 9 6 - 20 MG/DL    Creatinine 0.75 0.55 - 1.02 MG/DL    BUN/Creatinine ratio 12 12 - 20      GFR est AA >60 >60 ml/min/1.73m2    GFR est non-AA >60 >60 ml/min/1.73m2    Calcium 9.1 8.5 - 10.1 MG/DL    Bilirubin, total 0.5 0.2 - 1.0 MG/DL    ALT (SGPT) 28 12 - 78 U/L    AST (SGOT) 16 15 - 37 U/L    Alk.  phosphatase 88 45 - 117 U/L    Protein, total 6.9 6.4 - 8.2 g/dL    Albumin 3.8 3.5 - 5.0 g/dL    Globulin 3.1 2.0 - 4.0 g/dL    A-G Ratio 1.2 1.1 - 2.2     PROTHROMBIN TIME + INR    Collection Time: 09/03/18  2:58 PM   Result Value Ref Range    INR 1.0 0.9 - 1.1      Prothrombin time 10.0 9.0 - 11.1 sec   TYPE & SCREEN Collection Time: 09/03/18  2:58 PM   Result Value Ref Range    Crossmatch Expiration 09/06/2018     ABO/Rh(D) O POSITIVE     Antibody screen NEG    EKG, 12 LEAD, INITIAL    Collection Time: 09/03/18  4:30 PM   Result Value Ref Range    Ventricular Rate 77 BPM    Atrial Rate 77 BPM    P-R Interval 168 ms    QRS Duration 86 ms    Q-T Interval 368 ms    QTC Calculation (Bezet) 416 ms    Calculated P Axis 52 degrees    Calculated R Axis 69 degrees    Calculated T Axis 66 degrees    Diagnosis       Normal sinus rhythm  Normal ECG  No previous ECGs available         Radiologic Studies -   XR FEMUR LT 2 V   Final Result   Initial Result:     Impression:     IMPRESSION:  Comminuted displaced acute fracture of the mid to distal femoral  diaphysis with foreshortening and apex medial angulation.        Narrative:     EXAM:  XR FEMUR LT 2 V    INDICATION:   Trauma    COMPARISON: None. FINDINGS: 2 views of the left femur were obtained. Comminuted displaced fracture  of the mid to distal femoral diaphysis with approximately 5 cm of  foreshortening, and apex and medial angulation. Alignment at the left hip joint  is preserved. Alignment at the left knee joint also appears preserved. No  additional fractures are identified. CXR Results  (Last 48 hours)               09/03/18 1627  XR CHEST SNGL V Final result    Impression:  IMPRESSION: No acute cardiopulmonary process. Narrative:  EXAM:  XR CHEST SNGL V       INDICATION:   Chest Pain       COMPARISON: Chest radiograph 4/27/2016. FINDINGS: AP radiograph of the chest was obtained. No evidence of focal consolidation. No pleural effusion or pneumothorax. Heart,   jolanta, mediastinum are within normal limits. No acute osseous abnormalities. Medical Decision Making   I am the first provider for this patient.     I reviewed the vital signs, available nursing notes, past medical history, past surgical history, family history and social history. Vital Signs-Reviewed the patient's vital signs. Patient Vitals for the past 12 hrs:   Temp Pulse Resp BP SpO2   09/03/18 1637 - 80 18 161/77 100 %   09/03/18 1500 - - - 170/69 100 %   09/03/18 1449 97.5 °F (36.4 °C) 79 18 187/64 100 %     EKG interpretation: (Preliminary): 1630  Rhythm: normal sinus rhythm; and regular . Rate (approx.): 77; Axis: normal; VT interval: normal; QRS interval: normal ; ST/T wave: normal; Other findings: normal.  Written by Frankey Krabbe, ED Scribe, as dictated by Ariana Morton M.D. Records Reviewed: Nursing Notes, Old Medical Records, Ambulance Run Sheet, Previous Radiology Studies and Previous Laboratory Studies    Provider Notes (Medical Decision Making):   Patient presents with L leg pain after trauma. DDx: dislocation, fracture, contusion. Will get analgesics and xrays. Neurovasularly intact. ED Course:   Initial assessment performed. The patients presenting problems have been discussed, and they are in agreement with the care plan formulated and outlined with them. I have encouraged them to ask questions as they arise throughout their visit. PROGRESS NOTE:  3:47 PM  Pt off to XR. CONSULT NOTE:  4:16 PM  Ariana Morton M.D spoke with THOM Chawla and Brittany Ospina MD,  Specialty: Orthopedics  Discussed patient's hx, disposition, and available diagnostic and imaging results. Reviewed care plans. Consultant agrees with plans as outlined. They will see pt and likely admit. Critical Care Time:   0    Disposition:  PLAN:  1. Admit    ADMIT NOTE:  4:21 PM  Patient is being admitted to the hospital by Dr. Brittany Ospina. The results of their tests and reasons for their admission have been discussed with them and/or available family. They convey agreement and understanding for the need to be admitted and for their admission diagnosis. Consultation has been made with the inpatient physician specialist for hospitalization.     Diagnosis     Clinical Impression:   1. Closed displaced transverse fracture of shaft of left femur, initial encounter ([facility] [address]        Attestations: This note is prepared by Radha Astudillo, acting as Scribe for James Herring M.D. James Herring M.D: The scribe's documentation has been prepared under my direction and personally reviewed by me in its entirety. I confirm that the note above accurately reflects all work, treatment, procedures, and medical decision making performed by me. This note will not be viewable in [address]

## 2018-09-03 NOTE — CONSULTS
Hospitalist Consultation Note    NAME:  Flaquito Preciado   :   1950   MRN:   [id number]     ATTENDING: Savanna Hallman DO  PCP:  Meagan Gaxiola MD    Date/Time:  9/3/2018 6:06 PM      Recommendations/Plan:     Active Problems:    Fracture, femur closed, shaft ([address] (9/3/2018)      Hyperlipidemia (9/3/2018)      Osteoporosis (9/3/2018)      Asthma (9/3/2018)       L mid/distal Femur fracture POA  S/p mechanical fall at home  Osteoporosis- severe  H/o long term bisphosphonate therapy >5 yrs aggregate (last cycle since past 3 yrs)  Preop Medical Clearance   Increased BP/accelerated HTN POA- likely due to pain due to above  CXR neg  EKG= NSR,no acute changes  UA neg for infection except blood & ketones  LFTs normal    IVF when NPO p MN  Pain management will help BP control  IV hydralazine prn  Incentive spirometry perioperatively  Pt may need to be taken off Bisphosphonate (Reclast) & consider other therapies for sever osteoporosis- ?Raloxifene versus Hormonal replacement if no contraindications  OP referral to Endocrinology recommended- discussed with patient & family in detail tonight. Cont Ca+ Vit D3 for now  Pre-op cardiac risk assessment:  Pt evaluated using revised cardiac risk index and is felt to be low cardiovascular risk for intermediate risk surgery with a 0.4% risk for major complications based on these criteria. This risk has been discussed with the patient and family and pt wishes to proceed. Plan for surgery without further cardiac testing if this risk is acceptable per surgery and anesthesia. Further risk reduction will involve medical management of other comorbid conditions in the perioperative period.     Hyperlipidemia  Asthma- controlled  Cont statin  Cont home Breo, Singulair    H/o Skin Ca  Family h/o colon ca in mother    Code Status: Full code as per the pt's wishes tonight  Surrogate Decision maker: Jean-Claude Eisenberg # [phone]  DVT Prophylaxis: SQ Heparin x 1 now, SCDs there after till post op - resume Lovenox        Subjective:   REQUESTING PHYSICIAN: Dr Dayna Turcios:  Preop medical clearance, medical management  [place] is a 70 y.o.  female who I was asked to see for preop medical clearance & Medical management in this 71 yo osteoporotic lady with asthma, Hyperlipidemia, h/o skin cancers (BCCa) came in with L femur fracture s/p fall at home (trivial tripping)  Denies having any chest pain, SOB, palpitation, syncope prior or after the fall. H/o taking Reclast for ~5 yrs then off it for few yrs then back on it for past 3 yrs as per pt  H/o Hyperlipidemia- on statin for years- HDL is great, LDL & Trig are normal as per the pt  Asthma has been stable on Breo & Singulair. H/o taking Advil 400mg BID pretty much daily for her arthritis- no h/o GERD symptoms or Blood in Stools/anemia  Last colonoscopy was by Dr Hansa Moy for family h/o Colon ca in mother- negative & rescheduled every 5 yrs. Past Medical History:   Diagnosis Date    Asthma     Cancer [facility])     skin      Past Surgical History:   Procedure Laterality Date    COLONOSCOPY N/A 7/26/2016    COL[facility]res [facility] ENDOSCOPY    HX OTHER SURGICAL      exc skin ca     Social History   Substance Use Topics    Smoking status: Never Smoker    Smokeless tobacco: Never Used    Alcohol use No     Family History   Mother had colon Ca  Heart disease in family as per pt    No Known Allergies   Prior to Admission medications    Medication Sig Start Date End Date Taking? Authorizing Provider   fluticasone-vilanterol (BREO ELLIPTA) 100-25 mcg/dose inhaler Take 1 Puff by inhalation daily. Yes Karan Rogers MD   zoledronic acid (RECLAST) 5 mg/100 mL pgbk 5 mg by IntraVENous route once. Once a year infusion   Yes Karan Rogers MD   vitamin E, dl,tocopheryl acet, (VITAMIN E ACETATE) 400 unit cap capsule Take 400 Units by mouth daily.    Yes Karan Rogers MD   ibuprofen (MOTRIN) 400 mg tablet Take 400 mg by mouth every twelve (12) hours. Yes Phys Other, MD   montelukast (SINGULAIR) 10 mg tablet Take 10 mg by mouth daily. Yes Historical Provider   simvastatin (ZOCOR) 20 mg tablet Take  by mouth nightly. Yes Historical Provider   Cetirizine 10 mg cap Take  by mouth daily. Yes Historical Provider   mometasone (NASONEX) 50 mcg/actuation nasal spray 2 Sprays daily. Yes Historical Provider   cholecalciferol (VITAMIN D3) 1,000 unit tablet Take  by mouth daily. Yes Historical Provider   multivitamin (ONE A DAY) tablet Take 1 Tab by mouth daily. Yes Historical Provider   calcium-cholecalciferol, d3, 600-125 mg-unit tab Take  by mouth daily.    Yes Historical Provider       REVIEW OF SYSTEMS:     Total of 12 systems reviewed as follows:           POSITIVE= underlined text  Negative = text not underlined  General:  fever, chills, sweats, generalized weakness, weight loss/gain,      loss of appetite   Eyes:    blurred vision, eye pain, loss of vision, double vision  ENT:    rhinorrhea, pharyngitis   Respiratory:   cough, sputum production, SOB, wheezing, CHANEY, pleuritic pain   Cardiology:   chest pain, palpitations, orthopnea, PND, edema, syncope   Gastrointestinal:  abdominal pain , N/V, dysphagia, diarrhea, constipation, bleeding   Genitourinary:  frequency, urgency, dysuria, hematuria, incontinence   Muskuloskeletal :  Arthralgia ( L leg ), myalgia   Hematology:  easy bruising, nose or gum bleeding, lymphadenopathy   Dermatological: rash, ulceration, pruritis   Endocrine:   hot flashes or polydipsia   Neurological:  headache, dizziness, confusion, focal weakness, paresthesia,     Speech difficulties, memory loss, gait disturbance  Psychological: Feelings of anxiety, depression, agitation    Objective:   VITALS:    Visit Vitals    /78 (BP 1 Location: Right arm, BP Patient Position: Post activity;Supine)    Pulse 73    Temp 97.5 °F (36.4 °C)    Resp 22    Ht 5' 5.5\" (1.664 m)    Wt 85.3 kg (188 lb)    SpO2 100%    BMI 30.81 kg/m2     Temp (24hrs), Av.8 °F (36.6 °C), Min:97.5 °F (36.4 °C), Max:98.3 °F (36.8 °C)      PHYSICAL EXAM:   General:    Alert, cooperative, no distress, appears stated age. HEENT: Atraumatic, anicteric sclerae, pink conjunctivae     No oral ulcers, mucosa moist, throat clear  Neck:  Supple, symmetrical,  thyroid: non tender  Lungs:   Clear to auscultation bilaterally. No Wheezing or Rhonchi. No rales. Chest wall:  No tenderness  No Accessory muscle use. Heart:   Regular  rhythm,  No  murmur   No edema  Abdomen:   Soft, non-tender. Not distended. Bowel sounds normal  Extremities: No cyanosis. No clubbing, L LE shortened & externally rotated +, tender on any movement  Skin:     Not pale. Not Jaundiced  No rashes   Psych:  Good insight. Not depressed. Not anxious or agitated. Neurologic: EOMs intact. No facial asymmetry. No aphasia or slurred speech. Symmetrical strength, Alert and oriented X 4.     _______________________________________________________________________  Care Plan discussed with:    Comments   Patient x    Family  x Family at bedside including sister/mPOA in room 80   RN x Delta Giron                    Consultant:  x Ortho PA   ____________________________________________________________________  TOTAL TIME:     40 mins    Comments    x Reviewed previous records   >50% of visit spent in counseling and coordination of care x Discussion with patient and family and questions answered       Critical Care Provided     Minutes non procedure based  ________________________________________________________________________  Signed: Debbie Ji MD      Procedures: see electronic medical records for all procedures/Xrays and details which were not copied into this note but were reviewed prior to creation of Plan.     LAB DATA REVIEWED:    Recent Results (from the past 24 hour(s))   CBC WITH AUTOMATED DIFF    Collection Time: 18 2:58 PM   Result Value Ref Range    WBC 7.4 3.6 - 11.0 K/uL    RBC 4.04 3.80 - 5.20 M/uL    HGB 12.5 11.5 - 16.0 g/dL    HCT 36.6 35.0 - 47.0 %    MCV 90.6 80.0 - 99.0 FL    MCH 30.9 26.0 - 34.0 PG    MCHC 34.2 30.0 - 36.5 g/dL    RDW 12.9 11.5 - 14.5 %    PLATELET 790 412 - 465 K/uL    MPV 10.2 8.9 - 12.9 FL    NRBC 0.0 0  WBC    ABSOLUTE NRBC 0.00 0.00 - 0.01 K/uL    NEUTROPHILS 75 32 - 75 %    LYMPHOCYTES 15 12 - 49 %    MONOCYTES 9 5 - 13 %    EOSINOPHILS 0 0 - 7 %    BASOPHILS 0 0 - 1 %    IMMATURE GRANULOCYTES 0 0.0 - 0.5 %    ABS. NEUTROPHILS 5.5 1.8 - 8.0 K/UL    ABS. LYMPHOCYTES 1.1 0.8 - 3.5 K/UL    ABS. MONOCYTES 0.7 0.0 - 1.0 K/UL    ABS. EOSINOPHILS 0.0 0.0 - 0.4 K/UL    ABS. BASOPHILS 0.0 0.0 - 0.1 K/UL    ABS. IMM. GRANS. 0.0 0.00 - 0.04 K/UL    DF AUTOMATED     METABOLIC PANEL, COMPREHENSIVE    Collection Time: 09/03/18  2:58 PM   Result Value Ref Range    Sodium 132 (L) 136 - 145 mmol/L    Potassium 3.9 3.5 - 5.1 mmol/L    Chloride 98 97 - 108 mmol/L    CO2 25 21 - 32 mmol/L    Anion gap 9 5 - 15 mmol/L    Glucose 107 (H) 65 - 100 mg/dL    BUN 9 6 - 20 MG/DL    Creatinine 0.75 0.55 - 1.02 MG/DL    BUN/Creatinine ratio 12 12 - 20      GFR est AA >60 >60 ml/min/1.73m2    GFR est non-AA >60 >60 ml/min/1.73m2    Calcium 9.1 8.5 - 10.1 MG/DL    Bilirubin, total 0.5 0.2 - 1.0 MG/DL    ALT (SGPT) 28 12 - 78 U/L    AST (SGOT) 16 15 - 37 U/L    Alk.  phosphatase 88 45 - 117 U/L    Protein, total 6.9 6.4 - 8.2 g/dL    Albumin 3.8 3.5 - 5.0 g/dL    Globulin 3.1 2.0 - 4.0 g/dL    A-G Ratio 1.2 1.1 - 2.2     PROTHROMBIN TIME + INR    Collection Time: 09/03/18  2:58 PM   Result Value Ref Range    INR 1.0 0.9 - 1.1      Prothrombin time 10.0 9.0 - 11.1 sec   TYPE & SCREEN    Collection Time: 09/03/18  2:58 PM   Result Value Ref Range    Crossmatch Expiration 09/06/2018     ABO/Rh(D) Genesis Osullivan POSITIVE     Antibody screen NEG    EKG, 12 LEAD, INITIAL    Collection Time: 09/03/18  4:30 PM   Result Value Ref Range Ventricular Rate 77 BPM    Atrial Rate 77 BPM    P-R Interval 168 ms    QRS Duration 86 ms    Q-T Interval 368 ms    QTC Calculation (Bezet) 416 ms    Calculated P Axis 52 degrees    Calculated R Axis 69 degrees    Calculated T Axis 66 degrees    Diagnosis       Normal sinus rhythm  Normal ECG  No previous ECGs available     URINALYSIS W/ REFLEX CULTURE    Collection Time: 09/03/18  4:44 PM   Result Value Ref Range    Color YELLOW/STRAW      Appearance CLEAR CLEAR      Specific gravity 1.007 1.003 - 1.030      pH (UA) 7.5 5.0 - 8.0      Protein NEGATIVE  NEG mg/dL    Glucose NEGATIVE  NEG mg/dL    Ketone TRACE (A) NEG mg/dL    Bilirubin NEGATIVE  NEG      Blood TRACE (A) NEG      Urobilinogen 0.2 0.2 - 1.0 EU/dL    Nitrites NEGATIVE  NEG      Leukocyte Esterase NEGATIVE  NEG      WBC 0-4 0 - 4 /hpf    RBC 0-5 0 - 5 /hpf    Epithelial cells FEW FEW /lpf    Bacteria NEGATIVE  NEG /hpf    UA:UC IF INDICATED CULTURE NOT INDICATED BY UA RESULT CNI         _____________________________  Hospitalist: Sergey Jacobs MD

## 2018-09-03 NOTE — ED NOTES
Verbal order for rosas catheter received from Oliver Madhu Narciso TOMAS.  Michelle TOMAS at bedside speaking with patient regarding plan of care for surgery, admission

## 2018-09-04 ENCOUNTER — ANESTHESIA EVENT (OUTPATIENT)
Dept: SURGERY | Age: 68
DRG: 481 | End: 2018-09-04
Payer: MEDICARE

## 2018-09-04 ENCOUNTER — ANESTHESIA (OUTPATIENT)
Dept: SURGERY | Age: 68
DRG: 481 | End: 2018-09-04
Payer: MEDICARE

## 2018-09-04 ENCOUNTER — APPOINTMENT (OUTPATIENT)
Dept: GENERAL RADIOLOGY | Age: 68
DRG: 481 | End: 2018-09-04
Attending: ORTHOPAEDIC SURGERY
Payer: MEDICARE

## 2018-09-04 LAB
ATRIAL RATE: 77 BPM
CALCULATED P AXIS, ECG09: 52 DEGREES
CALCULATED R AXIS, ECG10: 69 DEGREES
CALCULATED T AXIS, ECG11: 66 DEGREES
DIAGNOSIS, 93000: NORMAL
P-R INTERVAL, ECG05: 168 MS
Q-T INTERVAL, ECG07: 368 MS
QRS DURATION, ECG06: 86 MS
QTC CALCULATION (BEZET), ECG08: 416 MS
VENTRICULAR RATE, ECG03: 77 BPM

## 2018-09-04 PROCEDURE — 74011250636 HC RX REV CODE- 250/636: Performed by: ORTHOPAEDIC SURGERY

## 2018-09-04 PROCEDURE — 76060000034 HC ANESTHESIA 1.5 TO 2 HR: Performed by: ORTHOPAEDIC SURGERY

## 2018-09-04 PROCEDURE — 74011250637 HC RX REV CODE- 250/637: Performed by: ORTHOPAEDIC SURGERY

## 2018-09-04 PROCEDURE — 74011250636 HC RX REV CODE- 250/636: Performed by: ANESTHESIOLOGY

## 2018-09-04 PROCEDURE — 77030008467 HC STPLR SKN COVD -B: Performed by: ORTHOPAEDIC SURGERY

## 2018-09-04 PROCEDURE — 74011250636 HC RX REV CODE- 250/636

## 2018-09-04 PROCEDURE — 76001 XR FLUOROSCOPY OVER 60 MINUTES: CPT

## 2018-09-04 PROCEDURE — 77030011640 HC PAD GRND REM COVD -A: Performed by: ORTHOPAEDIC SURGERY

## 2018-09-04 PROCEDURE — 77030026438 HC STYL ET INTUB CARD -A: Performed by: NURSE ANESTHETIST, CERTIFIED REGISTERED

## 2018-09-04 PROCEDURE — 77030032490 HC SLV COMPR SCD KNE COVD -B: Performed by: ORTHOPAEDIC SURGERY

## 2018-09-04 PROCEDURE — 74011000250 HC RX REV CODE- 250

## 2018-09-04 PROCEDURE — 94760 N-INVAS EAR/PLS OXIMETRY 1: CPT

## 2018-09-04 PROCEDURE — 0QS906Z REPOSITION LEFT FEMORAL SHAFT WITH INTRAMEDULLARY INTERNAL FIXATION DEVICE, OPEN APPROACH: ICD-10-PCS | Performed by: ORTHOPAEDIC SURGERY

## 2018-09-04 PROCEDURE — 77030000031 HC BIT DRL QC SYNT -C: Performed by: ORTHOPAEDIC SURGERY

## 2018-09-04 PROCEDURE — C1769 GUIDE WIRE: HCPCS | Performed by: ORTHOPAEDIC SURGERY

## 2018-09-04 PROCEDURE — 77030019908 HC STETH ESOPH SIMS -A: Performed by: NURSE ANESTHETIST, CERTIFIED REGISTERED

## 2018-09-04 PROCEDURE — 74011250637 HC RX REV CODE- 250/637: Performed by: PHYSICIAN ASSISTANT

## 2018-09-04 PROCEDURE — 77030014405 HC GD ROD RMR SYNT -C: Performed by: ORTHOPAEDIC SURGERY

## 2018-09-04 PROCEDURE — 77030016474 HC BIT DRL QC3 SYNT -C: Performed by: ORTHOPAEDIC SURGERY

## 2018-09-04 PROCEDURE — C1713 ANCHOR/SCREW BN/BN,TIS/BN: HCPCS | Performed by: ORTHOPAEDIC SURGERY

## 2018-09-04 PROCEDURE — 77030008684 HC TU ET CUF COVD -B: Performed by: NURSE ANESTHETIST, CERTIFIED REGISTERED

## 2018-09-04 PROCEDURE — 77030020782 HC GWN BAIR PAWS FLX 3M -B

## 2018-09-04 PROCEDURE — 73552 X-RAY EXAM OF FEMUR 2/>: CPT

## 2018-09-04 PROCEDURE — 76210000017 HC OR PH I REC 1.5 TO 2 HR: Performed by: ORTHOPAEDIC SURGERY

## 2018-09-04 PROCEDURE — 74011250636 HC RX REV CODE- 250/636: Performed by: PHYSICIAN ASSISTANT

## 2018-09-04 PROCEDURE — 76010000153 HC OR TIME 1.5 TO 2 HR: Performed by: ORTHOPAEDIC SURGERY

## 2018-09-04 PROCEDURE — 77030021678 HC GLIDESCP STAT DISP VERT -B: Performed by: NURSE ANESTHETIST, CERTIFIED REGISTERED

## 2018-09-04 PROCEDURE — 65270000029 HC RM PRIVATE

## 2018-09-04 PROCEDURE — 77030031139 HC SUT VCRL2 J&J -A: Performed by: ORTHOPAEDIC SURGERY

## 2018-09-04 PROCEDURE — 77030020788: Performed by: ORTHOPAEDIC SURGERY

## 2018-09-04 PROCEDURE — 77030018836 HC SOL IRR NACL ICUM -A: Performed by: ORTHOPAEDIC SURGERY

## 2018-09-04 DEVICE — SCREW BNE L34MM DIA5MM NONSTERILE TIB LT GRN TI ST CANN LOK: Type: IMPLANTABLE DEVICE | Site: LEG | Status: FUNCTIONAL

## 2018-09-04 DEVICE — IMPLANTABLE DEVICE: Type: IMPLANTABLE DEVICE | Site: LEG | Status: FUNCTIONAL

## 2018-09-04 DEVICE — SCREW BNE L46MM DIA5MM NONSTERILE TIB LT GRN TI ST CANN LOK: Type: IMPLANTABLE DEVICE | Site: LEG | Status: FUNCTIONAL

## 2018-09-04 RX ORDER — LIDOCAINE HYDROCHLORIDE 20 MG/ML
INJECTION, SOLUTION EPIDURAL; INFILTRATION; INTRACAUDAL; PERINEURAL AS NEEDED
Status: DISCONTINUED | OUTPATIENT
Start: 2018-09-04 | End: 2018-09-04 | Stop reason: HOSPADM

## 2018-09-04 RX ORDER — ASPIRIN 325 MG
325 TABLET, DELAYED RELEASE (ENTERIC COATED) ORAL EVERY 12 HOURS
Status: DISCONTINUED | OUTPATIENT
Start: 2018-09-04 | End: 2018-09-06 | Stop reason: HOSPADM

## 2018-09-04 RX ORDER — SODIUM CHLORIDE 0.9 % (FLUSH) 0.9 %
5-10 SYRINGE (ML) INJECTION EVERY 8 HOURS
Status: DISCONTINUED | OUTPATIENT
Start: 2018-09-05 | End: 2018-09-06 | Stop reason: HOSPADM

## 2018-09-04 RX ORDER — SODIUM CHLORIDE 0.9 % (FLUSH) 0.9 %
5-10 SYRINGE (ML) INJECTION AS NEEDED
Status: DISCONTINUED | OUTPATIENT
Start: 2018-09-04 | End: 2018-09-04 | Stop reason: HOSPADM

## 2018-09-04 RX ORDER — KETOROLAC TROMETHAMINE 30 MG/ML
30 INJECTION, SOLUTION INTRAMUSCULAR; INTRAVENOUS
Status: COMPLETED | OUTPATIENT
Start: 2018-09-04 | End: 2018-09-04

## 2018-09-04 RX ORDER — SODIUM CHLORIDE, SODIUM LACTATE, POTASSIUM CHLORIDE, CALCIUM CHLORIDE 600; 310; 30; 20 MG/100ML; MG/100ML; MG/100ML; MG/100ML
25 INJECTION, SOLUTION INTRAVENOUS CONTINUOUS
Status: DISCONTINUED | OUTPATIENT
Start: 2018-09-04 | End: 2018-09-04 | Stop reason: HOSPADM

## 2018-09-04 RX ORDER — ONDANSETRON 2 MG/ML
4 INJECTION INTRAMUSCULAR; INTRAVENOUS AS NEEDED
Status: DISCONTINUED | OUTPATIENT
Start: 2018-09-04 | End: 2018-09-04 | Stop reason: HOSPADM

## 2018-09-04 RX ORDER — SODIUM CHLORIDE 0.9 % (FLUSH) 0.9 %
5-10 SYRINGE (ML) INJECTION AS NEEDED
Status: DISCONTINUED | OUTPATIENT
Start: 2018-09-04 | End: 2018-09-06 | Stop reason: HOSPADM

## 2018-09-04 RX ORDER — FENTANYL CITRATE 50 UG/ML
25 INJECTION, SOLUTION INTRAMUSCULAR; INTRAVENOUS
Status: DISCONTINUED | OUTPATIENT
Start: 2018-09-04 | End: 2018-09-04 | Stop reason: HOSPADM

## 2018-09-04 RX ORDER — SODIUM CHLORIDE 9 MG/ML
125 INJECTION, SOLUTION INTRAVENOUS CONTINUOUS
Status: DISPENSED | OUTPATIENT
Start: 2018-09-04 | End: 2018-09-05

## 2018-09-04 RX ORDER — ACETAMINOPHEN 325 MG/1
650 TABLET ORAL EVERY 6 HOURS
Status: DISCONTINUED | OUTPATIENT
Start: 2018-09-04 | End: 2018-09-06 | Stop reason: HOSPADM

## 2018-09-04 RX ORDER — LIDOCAINE HYDROCHLORIDE 10 MG/ML
0.1 INJECTION, SOLUTION EPIDURAL; INFILTRATION; INTRACAUDAL; PERINEURAL AS NEEDED
Status: DISCONTINUED | OUTPATIENT
Start: 2018-09-04 | End: 2018-09-04 | Stop reason: HOSPADM

## 2018-09-04 RX ORDER — ONDANSETRON 2 MG/ML
4 INJECTION INTRAMUSCULAR; INTRAVENOUS
Status: ACTIVE | OUTPATIENT
Start: 2018-09-04 | End: 2018-09-05

## 2018-09-04 RX ORDER — MORPHINE SULFATE 10 MG/ML
2 INJECTION, SOLUTION INTRAMUSCULAR; INTRAVENOUS
Status: DISCONTINUED | OUTPATIENT
Start: 2018-09-04 | End: 2018-09-04 | Stop reason: HOSPADM

## 2018-09-04 RX ORDER — AMOXICILLIN 250 MG
1 CAPSULE ORAL EVERY 12 HOURS
Status: DISCONTINUED | OUTPATIENT
Start: 2018-09-04 | End: 2018-09-06 | Stop reason: HOSPADM

## 2018-09-04 RX ORDER — DEXAMETHASONE SODIUM PHOSPHATE 4 MG/ML
INJECTION, SOLUTION INTRA-ARTICULAR; INTRALESIONAL; INTRAMUSCULAR; INTRAVENOUS; SOFT TISSUE AS NEEDED
Status: DISCONTINUED | OUTPATIENT
Start: 2018-09-04 | End: 2018-09-04 | Stop reason: HOSPADM

## 2018-09-04 RX ORDER — POLYETHYLENE GLYCOL 3350 17 G/17G
17 POWDER, FOR SOLUTION ORAL DAILY
Status: DISCONTINUED | OUTPATIENT
Start: 2018-09-05 | End: 2018-09-06 | Stop reason: HOSPADM

## 2018-09-04 RX ORDER — FENTANYL CITRATE 50 UG/ML
50 INJECTION, SOLUTION INTRAMUSCULAR; INTRAVENOUS AS NEEDED
Status: DISCONTINUED | OUTPATIENT
Start: 2018-09-04 | End: 2018-09-04 | Stop reason: HOSPADM

## 2018-09-04 RX ORDER — SODIUM CHLORIDE 0.9 % (FLUSH) 0.9 %
5-10 SYRINGE (ML) INJECTION EVERY 8 HOURS
Status: DISCONTINUED | OUTPATIENT
Start: 2018-09-04 | End: 2018-09-04 | Stop reason: HOSPADM

## 2018-09-04 RX ORDER — PHENYLEPHRINE HCL IN 0.9% NACL 0.4MG/10ML
SYRINGE (ML) INTRAVENOUS AS NEEDED
Status: DISCONTINUED | OUTPATIENT
Start: 2018-09-04 | End: 2018-09-04 | Stop reason: HOSPADM

## 2018-09-04 RX ORDER — DIPHENHYDRAMINE HYDROCHLORIDE 50 MG/ML
12.5 INJECTION, SOLUTION INTRAMUSCULAR; INTRAVENOUS AS NEEDED
Status: DISCONTINUED | OUTPATIENT
Start: 2018-09-04 | End: 2018-09-04 | Stop reason: HOSPADM

## 2018-09-04 RX ORDER — ACETAMINOPHEN 10 MG/ML
INJECTION, SOLUTION INTRAVENOUS
Status: COMPLETED
Start: 2018-09-04 | End: 2018-09-04

## 2018-09-04 RX ORDER — SODIUM CHLORIDE, SODIUM LACTATE, POTASSIUM CHLORIDE, CALCIUM CHLORIDE 600; 310; 30; 20 MG/100ML; MG/100ML; MG/100ML; MG/100ML
25 INJECTION, SOLUTION INTRAVENOUS CONTINUOUS
Status: DISCONTINUED | OUTPATIENT
Start: 2018-09-04 | End: 2018-09-05

## 2018-09-04 RX ORDER — DIPHENHYDRAMINE HYDROCHLORIDE 50 MG/ML
12.5 INJECTION, SOLUTION INTRAMUSCULAR; INTRAVENOUS
Status: DISCONTINUED | OUTPATIENT
Start: 2018-09-04 | End: 2018-09-04 | Stop reason: HOSPADM

## 2018-09-04 RX ORDER — NALOXONE HYDROCHLORIDE 0.4 MG/ML
0.4 INJECTION, SOLUTION INTRAMUSCULAR; INTRAVENOUS; SUBCUTANEOUS AS NEEDED
Status: DISCONTINUED | OUTPATIENT
Start: 2018-09-04 | End: 2018-09-06 | Stop reason: HOSPADM

## 2018-09-04 RX ORDER — OXYCODONE HYDROCHLORIDE 5 MG/1
5 TABLET ORAL
Status: DISCONTINUED | OUTPATIENT
Start: 2018-09-04 | End: 2018-09-06 | Stop reason: HOSPADM

## 2018-09-04 RX ORDER — PROPOFOL 10 MG/ML
INJECTION, EMULSION INTRAVENOUS AS NEEDED
Status: DISCONTINUED | OUTPATIENT
Start: 2018-09-04 | End: 2018-09-04 | Stop reason: HOSPADM

## 2018-09-04 RX ORDER — SUCCINYLCHOLINE CHLORIDE 20 MG/ML
INJECTION INTRAMUSCULAR; INTRAVENOUS AS NEEDED
Status: DISCONTINUED | OUTPATIENT
Start: 2018-09-04 | End: 2018-09-04 | Stop reason: HOSPADM

## 2018-09-04 RX ORDER — MIDAZOLAM HYDROCHLORIDE 1 MG/ML
INJECTION, SOLUTION INTRAMUSCULAR; INTRAVENOUS AS NEEDED
Status: DISCONTINUED | OUTPATIENT
Start: 2018-09-04 | End: 2018-09-04 | Stop reason: HOSPADM

## 2018-09-04 RX ORDER — KETOROLAC TROMETHAMINE 30 MG/ML
INJECTION, SOLUTION INTRAMUSCULAR; INTRAVENOUS
Status: COMPLETED
Start: 2018-09-04 | End: 2018-09-04

## 2018-09-04 RX ORDER — ACETAMINOPHEN 10 MG/ML
1000 INJECTION, SOLUTION INTRAVENOUS ONCE
Status: COMPLETED | OUTPATIENT
Start: 2018-09-04 | End: 2018-09-04

## 2018-09-04 RX ORDER — HYDROMORPHONE HYDROCHLORIDE 2 MG/ML
INJECTION, SOLUTION INTRAMUSCULAR; INTRAVENOUS; SUBCUTANEOUS AS NEEDED
Status: DISCONTINUED | OUTPATIENT
Start: 2018-09-04 | End: 2018-09-04 | Stop reason: HOSPADM

## 2018-09-04 RX ORDER — OXYCODONE HYDROCHLORIDE 5 MG/1
2.5 TABLET ORAL
Status: DISCONTINUED | OUTPATIENT
Start: 2018-09-04 | End: 2018-09-06 | Stop reason: HOSPADM

## 2018-09-04 RX ORDER — FACIAL-BODY WIPES
10 EACH TOPICAL DAILY PRN
Status: DISCONTINUED | OUTPATIENT
Start: 2018-09-06 | End: 2018-09-06 | Stop reason: HOSPADM

## 2018-09-04 RX ORDER — ROCURONIUM BROMIDE 10 MG/ML
INJECTION, SOLUTION INTRAVENOUS AS NEEDED
Status: DISCONTINUED | OUTPATIENT
Start: 2018-09-04 | End: 2018-09-04 | Stop reason: HOSPADM

## 2018-09-04 RX ORDER — FERROUS SULFATE, DRIED 160(50) MG
1 TABLET, EXTENDED RELEASE ORAL
Status: DISCONTINUED | OUTPATIENT
Start: 2018-09-05 | End: 2018-09-06 | Stop reason: HOSPADM

## 2018-09-04 RX ORDER — ONDANSETRON 4 MG/1
4 TABLET, ORALLY DISINTEGRATING ORAL
Status: DISCONTINUED | OUTPATIENT
Start: 2018-09-04 | End: 2018-09-06 | Stop reason: HOSPADM

## 2018-09-04 RX ORDER — MIDAZOLAM HYDROCHLORIDE 1 MG/ML
0.5 INJECTION, SOLUTION INTRAMUSCULAR; INTRAVENOUS
Status: DISCONTINUED | OUTPATIENT
Start: 2018-09-04 | End: 2018-09-04 | Stop reason: HOSPADM

## 2018-09-04 RX ORDER — ONDANSETRON 2 MG/ML
INJECTION INTRAMUSCULAR; INTRAVENOUS AS NEEDED
Status: DISCONTINUED | OUTPATIENT
Start: 2018-09-04 | End: 2018-09-04 | Stop reason: HOSPADM

## 2018-09-04 RX ORDER — HYDROMORPHONE HYDROCHLORIDE 1 MG/ML
.2-.5 INJECTION, SOLUTION INTRAMUSCULAR; INTRAVENOUS; SUBCUTANEOUS
Status: DISCONTINUED | OUTPATIENT
Start: 2018-09-04 | End: 2018-09-04 | Stop reason: HOSPADM

## 2018-09-04 RX ORDER — CEFAZOLIN SODIUM/WATER 2 G/20 ML
2 SYRINGE (ML) INTRAVENOUS EVERY 8 HOURS
Status: COMPLETED | OUTPATIENT
Start: 2018-09-04 | End: 2018-09-05

## 2018-09-04 RX ORDER — FENTANYL CITRATE 50 UG/ML
INJECTION, SOLUTION INTRAMUSCULAR; INTRAVENOUS AS NEEDED
Status: DISCONTINUED | OUTPATIENT
Start: 2018-09-04 | End: 2018-09-04 | Stop reason: HOSPADM

## 2018-09-04 RX ADMIN — KETOROLAC TROMETHAMINE 30 MG: 30 INJECTION, SOLUTION INTRAMUSCULAR at 17:40

## 2018-09-04 RX ADMIN — FLUTICASONE FUROATE AND VILANTEROL TRIFENATATE 1 PUFF: 100; 25 POWDER RESPIRATORY (INHALATION) at 09:06

## 2018-09-04 RX ADMIN — PRAVASTATIN SODIUM 40 MG: 40 TABLET ORAL at 22:06

## 2018-09-04 RX ADMIN — SODIUM CHLORIDE 75 ML/HR: 900 INJECTION, SOLUTION INTRAVENOUS at 08:07

## 2018-09-04 RX ADMIN — MIDAZOLAM HYDROCHLORIDE 1 MG: 1 INJECTION, SOLUTION INTRAMUSCULAR; INTRAVENOUS at 14:09

## 2018-09-04 RX ADMIN — Medication 40 MCG: at 14:39

## 2018-09-04 RX ADMIN — KETOROLAC TROMETHAMINE 30 MG: 30 INJECTION, SOLUTION INTRAMUSCULAR; INTRAVENOUS at 17:40

## 2018-09-04 RX ADMIN — SUCCINYLCHOLINE CHLORIDE 150 MG: 20 INJECTION INTRAMUSCULAR; INTRAVENOUS at 14:14

## 2018-09-04 RX ADMIN — ACETAMINOPHEN 1000 MG: 10 INJECTION, SOLUTION INTRAVENOUS at 17:50

## 2018-09-04 RX ADMIN — PROPOFOL 100 MG: 10 INJECTION, EMULSION INTRAVENOUS at 14:14

## 2018-09-04 RX ADMIN — OXYCODONE HYDROCHLORIDE 5 MG: 5 TABLET ORAL at 09:03

## 2018-09-04 RX ADMIN — MIDAZOLAM HYDROCHLORIDE 1 MG: 1 INJECTION, SOLUTION INTRAMUSCULAR; INTRAVENOUS at 14:10

## 2018-09-04 RX ADMIN — ROCURONIUM BROMIDE 5 MG: 10 INJECTION, SOLUTION INTRAVENOUS at 14:14

## 2018-09-04 RX ADMIN — FENTANYL CITRATE 25 MCG: 50 INJECTION, SOLUTION INTRAMUSCULAR; INTRAVENOUS at 17:00

## 2018-09-04 RX ADMIN — Medication 2 G: at 14:26

## 2018-09-04 RX ADMIN — Medication 5 ML: at 22:06

## 2018-09-04 RX ADMIN — SODIUM CHLORIDE, POTASSIUM CHLORIDE, SODIUM LACTATE AND CALCIUM CHLORIDE: 600; 310; 30; 20 INJECTION, SOLUTION INTRAVENOUS at 15:40

## 2018-09-04 RX ADMIN — LIDOCAINE HYDROCHLORIDE 40 MG: 20 INJECTION, SOLUTION EPIDURAL; INFILTRATION; INTRACAUDAL; PERINEURAL at 14:14

## 2018-09-04 RX ADMIN — FENTANYL CITRATE 25 MCG: 50 INJECTION, SOLUTION INTRAMUSCULAR; INTRAVENOUS at 16:45

## 2018-09-04 RX ADMIN — PROPOFOL 30 MG: 10 INJECTION, EMULSION INTRAVENOUS at 14:47

## 2018-09-04 RX ADMIN — ACETAMINOPHEN 650 MG: 325 TABLET ORAL at 18:44

## 2018-09-04 RX ADMIN — SODIUM CHLORIDE, SODIUM LACTATE, POTASSIUM CHLORIDE, AND CALCIUM CHLORIDE 25 ML/HR: 600; 310; 30; 20 INJECTION, SOLUTION INTRAVENOUS at 14:03

## 2018-09-04 RX ADMIN — PROPOFOL 40 MG: 10 INJECTION, EMULSION INTRAVENOUS at 14:15

## 2018-09-04 RX ADMIN — Medication 2 G: at 22:07

## 2018-09-04 RX ADMIN — ONDANSETRON 4 MG: 2 INJECTION INTRAMUSCULAR; INTRAVENOUS at 15:38

## 2018-09-04 RX ADMIN — FENTANYL CITRATE 25 MCG: 50 INJECTION, SOLUTION INTRAMUSCULAR; INTRAVENOUS at 17:15

## 2018-09-04 RX ADMIN — HYDROMORPHONE HYDROCHLORIDE 0.4 MG: 2 INJECTION, SOLUTION INTRAMUSCULAR; INTRAVENOUS; SUBCUTANEOUS at 15:06

## 2018-09-04 RX ADMIN — ROCURONIUM BROMIDE 30 MG: 10 INJECTION, SOLUTION INTRAVENOUS at 14:22

## 2018-09-04 RX ADMIN — SODIUM CHLORIDE 125 ML/HR: 900 INJECTION, SOLUTION INTRAVENOUS at 16:45

## 2018-09-04 RX ADMIN — SODIUM CHLORIDE, POTASSIUM CHLORIDE, SODIUM LACTATE AND CALCIUM CHLORIDE: 600; 310; 30; 20 INJECTION, SOLUTION INTRAVENOUS at 14:32

## 2018-09-04 RX ADMIN — DEXAMETHASONE SODIUM PHOSPHATE 8 MG: 4 INJECTION, SOLUTION INTRA-ARTICULAR; INTRALESIONAL; INTRAMUSCULAR; INTRAVENOUS; SOFT TISSUE at 14:30

## 2018-09-04 RX ADMIN — FENTANYL CITRATE 25 MCG: 50 INJECTION, SOLUTION INTRAMUSCULAR; INTRAVENOUS at 16:28

## 2018-09-04 RX ADMIN — ROCURONIUM BROMIDE 10 MG: 10 INJECTION, SOLUTION INTRAVENOUS at 15:27

## 2018-09-04 RX ADMIN — MONTELUKAST SODIUM 10 MG: 10 TABLET, FILM COATED ORAL at 09:05

## 2018-09-04 RX ADMIN — Medication 40 MCG: at 15:37

## 2018-09-04 RX ADMIN — ACETAMINOPHEN 650 MG: 325 TABLET ORAL at 11:52

## 2018-09-04 RX ADMIN — Medication 80 MCG: at 15:07

## 2018-09-04 RX ADMIN — FENTANYL CITRATE 100 MCG: 50 INJECTION, SOLUTION INTRAMUSCULAR; INTRAVENOUS at 14:14

## 2018-09-04 RX ADMIN — FLUTICASONE PROPIONATE 2 SPRAY: 50 SPRAY, METERED NASAL at 09:05

## 2018-09-04 RX ADMIN — ROCURONIUM BROMIDE 10 MG: 10 INJECTION, SOLUTION INTRAVENOUS at 14:55

## 2018-09-04 RX ADMIN — OXYCODONE HYDROCHLORIDE 5 MG: 5 TABLET ORAL at 13:09

## 2018-09-04 RX ADMIN — Medication 80 MCG: at 15:14

## 2018-09-04 RX ADMIN — SENNOSIDES AND DOCUSATE SODIUM 1 TABLET: 8.6; 5 TABLET ORAL at 22:06

## 2018-09-04 RX ADMIN — ASPIRIN 325 MG: 325 TABLET, DELAYED RELEASE ORAL at 22:06

## 2018-09-04 RX ADMIN — FENTANYL CITRATE 25 MCG: 50 INJECTION, SOLUTION INTRAMUSCULAR; INTRAVENOUS at 16:20

## 2018-09-04 NOTE — PROGRESS NOTES
TRANSFER - IN REPORT:    Verbal report received from DARNELL Hernandez(name) on [address]  being received from [facility]) for routine post - op      Report consisted of patients Situation, Background, Assessment and   Recommendations(SBAR). Information from the following report(s) SBAR, Kardex, OR Summary, Procedure Summary, Intake/Output, MAR and Recent Results was reviewed with the receiving nurse. Opportunity for questions and clarification was provided. Assessment completed upon patients arrival to unit and care assumed.

## 2018-09-04 NOTE — PERIOP NOTES
TRANSFER - OUT REPORT:    Verbal report given to DARNELL Balderas on [place]  being transferred to [facility] [id number] for routine post - op       Report consisted of patients Situation, Background, Assessment and   Recommendations(SBAR). Information from the following report(s) SBAR, OR Summary, Intake/Output, MAR and Recent Results was reviewed with the receiving nurse. Opportunity for questions and clarification was provided.       Patient transported with:   O2 @ 3 liters  Registered Nurse  [facility]

## 2018-09-04 NOTE — PERIOP NOTES
Dr. Matthew Robert has been notified that patient continues experiencing pain after receiving Fentanyl IV. Dr. Matthew Robert ordered Torodol IV 30 mg and Ofirmev IV 1 gm for pain. These orders were received by telephone and were read back for confirmation.

## 2018-09-04 NOTE — ANESTHESIA PREPROCEDURE EVALUATION
Anesthetic History   No history of anesthetic complications            Review of Systems / Medical History  Patient summary reviewed, nursing notes reviewed and pertinent labs reviewed    Pulmonary            Asthma : well controlled       Neuro/Psych   Within defined limits           Cardiovascular              Hyperlipidemia    Exercise tolerance: >4 METS     GI/Hepatic/Renal  Within defined limits              Endo/Other        Obesity and cancer (skin cancer)     Other Findings   Comments: Fracture, femur closed, shaft          Physical Exam    Airway  Mallampati: III  TM Distance: 4 - 6 cm  Neck ROM: normal range of motion   Mouth opening: Diminished (comment)     Cardiovascular    Rhythm: regular  Rate: normal    Murmur: Grade 1     Dental    Dentition: Lower dentition intact, Upper dentition intact and Caps/crowns  Comments: Overbite   Pulmonary  Breath sounds clear to auscultation               Abdominal  GI exam deferred       Other Findings            Anesthetic Plan    ASA: 2  Anesthesia type: general    Monitoring Plan: BIS      Induction: Intravenous  Anesthetic plan and risks discussed with: Patient and Family

## 2018-09-04 NOTE — PROGRESS NOTES
Bedside shift change report given to Jennifer Sharif RN (oncoming nurse) by Alicia Cheney RN (offgoing nurse). Report included the following information SBAR, Kardex, OR Summary, Procedure Summary, Intake/Output, MAR and Recent Results.

## 2018-09-04 NOTE — PERIOP NOTES
TRANSFER - IN REPORT:    Verbal report received fromTawanna PATRICK on [place]  being received from [facility](unit) for ordered procedure      Report consisted of patients Situation, Background, Assessment and   Recommendations(SBAR). Information from the following report(s) Kardex and MAR was reviewed with the receiving nurse. Opportunity for questions and clarification was provided. Assessment will be completed upon patients arrival to unit and care will be assumed.

## 2018-09-04 NOTE — OP NOTES
[address]  OPERATIVE REPORT    Name:PRIYANK MOHAN  MR#: [id number]  : 1950  ACCOUNT #: [de-identified]   DATE OF SERVICE: 2018    PREOPERATIVE DIAGNOSIS:  Comminuted midshaft left femur fracture. POSTOPERATIVE DIAGNOSIS:  Comminuted midshaft left femur fracture. PROCEDURE PERFORMED:  Left femur retrograde intramedullary nail. SURGEON:  María Kaminski DO    ASSISTANT:  [facility] staff    ANESTHESIA:  General.    ESTIMATED BLOOD LOSS:  30 mL. COMPLICATIONS:  None. SPECIMENS REMOVED:  None. DISPOSITION:  Stable to PACU. IMPLANTS:  Synthes retrograde intramedullary femoral nail. INDICATIONS FOR THE PROCEDURE:  The patient is a 50-year-old female who presented to the hospital after tripping over a root and falling onto her left femur. She was found to have a comminuted and displaced midshaft left femur fracture. Orthopedics was consulted for management of this fracture. She was admitted to the hospital after we discussed risks and benefits of surgical fixation. Risks of infection, blood loss, neurovascular injury, anesthesia risks, and risks secondary to the patient's comorbidities were described in relation to retrograde intramedullary nailing of her femur fracture. We discussed other treatment options and decided this would be the best option for her. Informed consent was obtained. She was medically optimized by the hospitalist medical staff and ready for surgery on 2018. She was seen in the preoperative holding area prior to procedure. She was n.p.o. status and bed rest.  She remained comfortable and was taken to the OR on 2018. OPERATION:  The patient was taken to the OR and transferred to the operating room table. She was given sedation and intubated prior to transfer. Sterile prepping and draping was performed after we placed a nonsterile U-drape around the leg. After sterile prepping and draping, a timeout was called.   The patient was identified by name, date of birth, operative site, and operative procedure. After all were in agreement that the left leg was the operative extremity, we used the radiolucent triangle to obtain appropriate flexion of the knee. An incision was made for an approach to the medial parapatellar region. A small incision was made and careful dissection down to the peritenon was performed. We carefully incised the peritenon layer for later closure. We went medial to the patellar tendon, and this provided an excellent entry point into the femoral canal.  A guide pin was used to confirm appropriate positioning of our nail. We then overreamed after confirming position on AP and lateral x-rays and ensuring that we were avoiding the ACL. After overreaming, we placed a long ball-tipped guidewire and used a combination of traction and a bump to obtain a near anatomic reduction. Our guide pin was placed to the level of the lesser trochanter. Once our guide pin was appropriately placed, we began reaming. We started with a small size reamer and reamed up to a size 12.5. We measured the appropriate length of the nail. A size 11 x 360 mm retrograde femoral nail was chosen. We were then able to place our nail. We ensured on AP and lateral x-rays that we had adequate positioning proximally and distally to the fracture. We ensured no penetrance into the articular surface of the distal femur. We then placed the aiming arm and made incisions for placement of our distal locking screws. Once these were placed under x-ray guidance, we moved proximally to perform perfect [place] technique to place proximal locking screws. Two screws were placed in total proximally and this helped to stabilize our nail and rotation of the femur. We ensured anatomic rotation and reduction of the femur fracture was noted prior to placing our interlocking screws. Thorough irrigation of her wounds was performed after we took final x-rays.   Patient was awoken by anesthesia and transferred to PACU in stable condition after closure with 0 Vicryl, 2-0 Vicryl, and staples. Sterile dressings were applied prior to the patient being awoken as well. She will be monitored closely in the recovery unit and will start with physical therapy. We will start her with DVT prophylaxis. We will monitor her progress with physical therapy, partial weightbearing.       DO MABEL Caraballo  D: 09/04/2018 16:18     T: 09/04/2018 17:27  JOB #: [id number]

## 2018-09-04 NOTE — ANESTHESIA POSTPROCEDURE EVALUATION
Post-Anesthesia Evaluation and Assessment Patient: Soni Judge MRN: [id number]  SSN: [id number] YOB: 1950  Age: 70 y.o. Sex: female Cardiovascular Function/Vital Signs Visit Vitals  /55  Pulse 73  Temp 36.9 °C (98.5 °F)  Resp 16  
 Ht 5' 5.5\" (1.664 m)  Wt 85.3 kg (188 lb)  SpO2 99%  BMI 30.81 kg/m2 Patient is status post general anesthesia for Procedure(s): FEMUR INTRA MEDULLARY NAILING RETROGRADE Left. Nausea/Vomiting: None Postoperative hydration reviewed and adequate. Pain: 
Pain Scale 1: Numeric (0 - 10) (09/04/18 1730) Pain Intensity 1: 5 (09/04/18 1730) Managed Neurological Status:  
Neuro (WDL): Exceptions to WDL (09/04/18 1600) Neuro Neurologic State: Drowsy; Eyes open to voice (09/04/18 1600) Orientation Level: Oriented to place;Oriented to person (09/04/18 1600) Cognition: Follows commands;Decreased attention/concentration (09/04/18 1600) Speech: Delayed responses (09/04/18 1600) LUE Motor Response: Purposeful (09/04/18 1600) LLE Motor Response: Purposeful (09/04/18 1600) RUE Motor Response: Purposeful (09/04/18 1600) RLE Motor Response: Purposeful (09/04/18 1600) At baseline Mental Status and Level of Consciousness: Arousable Pulmonary Status:  
O2 Device: Nasal cannula (09/04/18 1730) Adequate oxygenation and airway patent Complications related to anesthesia: None Post-anesthesia assessment completed. No concerns Signed By: Lucinda Pantoja MD   
 September 4, 2018

## 2018-09-04 NOTE — PERIOP NOTES
Handoff Report from Operating Room to PACU    Report received from Falguni Rios RN and Sherman Malave CRNA regarding Aurther Late. Surgeon(s):  Mary Gloria DO  And Procedure(s) (LRB):  FEMUR INTRA MEDULLARY NAILING RETROGRADE Left (Left)  confirmed   with dressings discussed. Anesthesia type, drugs, patient history, complications, estimated blood loss, vital signs, intake and output, and last pain medication, lines, reversal medications and temperature were reviewed.

## 2018-09-04 NOTE — BRIEF OP NOTE
BRIEF OPERATIVE NOTE    Date of Procedure: 9/4/2018   Preoperative Diagnosis: LEFT femoral shaft FRACTURE  Postoperative Diagnosis: LEFT FEMUR FRACTURE    Procedure(s): FEMUR INTRA MEDULLARY NAILING RETROGRADE Left  Surgeon(s) and Role:     * Luana Huff DO - Primary         Surgical Assistant: [facility] staff    Surgical Staff:  Circ-1: Debra Gimenez RN  Circ-Relief: Aubrey Mortimer, RN  Radiology Technician: [id number] Tech-1: Nevaeh Oyster Deal  Surg Asst-1: Anu Ha  Float Staff: Joann Mays  Event Time In   Incision Start 1448   Incision Close 1552     Anesthesia: General   Estimated Blood Loss: 30cc  Specimens: * No specimens in log *   Findings: comminuted L femoral shaft fx   Complications: none  Implants:   Implant Name Type Inv.  Item Serial No.  Lot No. LRB No. Used Action   NAIL FEM CESAR RETRO AG [id number] -- TI STRL - SN/A  NAIL FEM CESAR RETRO AG [id number] -- TI STRL N/A [employer] [place] [id number] Left 1 Implanted   SCR BNE LCK T25 F/IM NL [id number] --  - SN/A  SCR BNE LCK T25 F/IM NL [id number] --  N/A [employer] [place] N/A Left 2 Implanted   SCR BNE LCK T25 F/IM NL [id number] --  - SN/A  SCR BNE LCK T25 F/IM NL [id number] --  N/A [employer] [place] N/A Left 1 Implanted   SCR BNE LCK T25 F/IM NL [id number] --  - SN/A   SCR BNE LCK T25 F/IM NL [id number] --  N/A [employer] [place] N/A Left 1 Implanted

## 2018-09-05 LAB
ANION GAP SERPL CALC-SCNC: 7 MMOL/L (ref 5–15)
BASOPHILS # BLD: 0 K/UL (ref 0–0.1)
BASOPHILS NFR BLD: 0 % (ref 0–1)
BUN SERPL-MCNC: 5 MG/DL (ref 6–20)
BUN/CREAT SERPL: 13 (ref 12–20)
CALCIUM SERPL-MCNC: 8 MG/DL (ref 8.5–10.1)
CHLORIDE SERPL-SCNC: 105 MMOL/L (ref 97–108)
CO2 SERPL-SCNC: 22 MMOL/L (ref 21–32)
CREAT SERPL-MCNC: 0.38 MG/DL (ref 0.55–1.02)
DIFFERENTIAL METHOD BLD: ABNORMAL
EOSINOPHIL # BLD: 0 K/UL (ref 0–0.4)
EOSINOPHIL NFR BLD: 0 % (ref 0–7)
ERYTHROCYTE [DISTWIDTH] IN BLOOD BY AUTOMATED COUNT: 13.1 % (ref 11.5–14.5)
GLUCOSE SERPL-MCNC: 114 MG/DL (ref 65–100)
HCT VFR BLD AUTO: 26.5 % (ref 35–47)
HGB BLD-MCNC: 8.8 G/DL (ref 11.5–16)
HGB BLD-MCNC: 8.9 G/DL (ref 11.5–16)
IMM GRANULOCYTES # BLD: 0 K/UL (ref 0–0.04)
IMM GRANULOCYTES NFR BLD AUTO: 1 % (ref 0–0.5)
LYMPHOCYTES # BLD: 0.9 K/UL (ref 0.8–3.5)
LYMPHOCYTES NFR BLD: 11 % (ref 12–49)
MCH RBC QN AUTO: 30.9 PG (ref 26–34)
MCHC RBC AUTO-ENTMCNC: 33.6 G/DL (ref 30–36.5)
MCV RBC AUTO: 92 FL (ref 80–99)
MONOCYTES # BLD: 0.6 K/UL (ref 0–1)
MONOCYTES NFR BLD: 8 % (ref 5–13)
NEUTS SEG # BLD: 6.3 K/UL (ref 1.8–8)
NEUTS SEG NFR BLD: 80 % (ref 32–75)
NRBC # BLD: 0 K/UL (ref 0–0.01)
NRBC BLD-RTO: 0 PER 100 WBC
PLATELET # BLD AUTO: 152 K/UL (ref 150–400)
PMV BLD AUTO: 10 FL (ref 8.9–12.9)
POTASSIUM SERPL-SCNC: 3.9 MMOL/L (ref 3.5–5.1)
RBC # BLD AUTO: 2.88 M/UL (ref 3.8–5.2)
SODIUM SERPL-SCNC: 134 MMOL/L (ref 136–145)
WBC # BLD AUTO: 7.8 K/UL (ref 3.6–11)

## 2018-09-05 PROCEDURE — 74011250637 HC RX REV CODE- 250/637: Performed by: INTERNAL MEDICINE

## 2018-09-05 PROCEDURE — 97116 GAIT TRAINING THERAPY: CPT

## 2018-09-05 PROCEDURE — 97165 OT EVAL LOW COMPLEX 30 MIN: CPT | Performed by: OCCUPATIONAL THERAPIST

## 2018-09-05 PROCEDURE — 97530 THERAPEUTIC ACTIVITIES: CPT | Performed by: OCCUPATIONAL THERAPIST

## 2018-09-05 PROCEDURE — G8987 SELF CARE CURRENT STATUS: HCPCS | Performed by: OCCUPATIONAL THERAPIST

## 2018-09-05 PROCEDURE — 74011250637 HC RX REV CODE- 250/637: Performed by: ORTHOPAEDIC SURGERY

## 2018-09-05 PROCEDURE — 80048 BASIC METABOLIC PNL TOTAL CA: CPT | Performed by: ORTHOPAEDIC SURGERY

## 2018-09-05 PROCEDURE — 97535 SELF CARE MNGMENT TRAINING: CPT | Performed by: OCCUPATIONAL THERAPIST

## 2018-09-05 PROCEDURE — G8988 SELF CARE GOAL STATUS: HCPCS | Performed by: OCCUPATIONAL THERAPIST

## 2018-09-05 PROCEDURE — 97161 PT EVAL LOW COMPLEX 20 MIN: CPT

## 2018-09-05 PROCEDURE — 74011250637 HC RX REV CODE- 250/637: Performed by: PHYSICIAN ASSISTANT

## 2018-09-05 PROCEDURE — 65270000029 HC RM PRIVATE

## 2018-09-05 PROCEDURE — 94760 N-INVAS EAR/PLS OXIMETRY 1: CPT

## 2018-09-05 PROCEDURE — 77010033678 HC OXYGEN DAILY

## 2018-09-05 PROCEDURE — 85018 HEMOGLOBIN: CPT | Performed by: ORTHOPAEDIC SURGERY

## 2018-09-05 PROCEDURE — 85025 COMPLETE CBC W/AUTO DIFF WBC: CPT | Performed by: PHYSICIAN ASSISTANT

## 2018-09-05 PROCEDURE — 36415 COLL VENOUS BLD VENIPUNCTURE: CPT | Performed by: ORTHOPAEDIC SURGERY

## 2018-09-05 PROCEDURE — 74011250636 HC RX REV CODE- 250/636: Performed by: ORTHOPAEDIC SURGERY

## 2018-09-05 PROCEDURE — 97530 THERAPEUTIC ACTIVITIES: CPT

## 2018-09-05 PROCEDURE — 74011000250 HC RX REV CODE- 250: Performed by: ORTHOPAEDIC SURGERY

## 2018-09-05 RX ADMIN — ACETAMINOPHEN 650 MG: 325 TABLET ORAL at 00:13

## 2018-09-05 RX ADMIN — ACETAMINOPHEN 650 MG: 325 TABLET ORAL at 06:27

## 2018-09-05 RX ADMIN — OXYCODONE HYDROCHLORIDE 5 MG: 5 TABLET ORAL at 18:02

## 2018-09-05 RX ADMIN — ACETAMINOPHEN 650 MG: 325 TABLET ORAL at 12:16

## 2018-09-05 RX ADMIN — Medication 5 ML: at 06:27

## 2018-09-05 RX ADMIN — Medication 10 ML: at 18:03

## 2018-09-05 RX ADMIN — FLUTICASONE PROPIONATE 2 SPRAY: 50 SPRAY, METERED NASAL at 09:18

## 2018-09-05 RX ADMIN — CALCIUM CARBONATE 500 MG (1,250 MG)-VITAMIN D3 200 UNIT TABLET 1 TABLET: at 17:50

## 2018-09-05 RX ADMIN — CALCIUM CARBONATE 500 MG (1,250 MG)-VITAMIN D3 200 UNIT TABLET 1 TABLET: at 12:16

## 2018-09-05 RX ADMIN — ASPIRIN 325 MG: 325 TABLET, DELAYED RELEASE ORAL at 22:07

## 2018-09-05 RX ADMIN — PRAVASTATIN SODIUM 40 MG: 40 TABLET ORAL at 22:08

## 2018-09-05 RX ADMIN — POLYETHYLENE GLYCOL 3350 17 G: 17 POWDER, FOR SOLUTION ORAL at 08:38

## 2018-09-05 RX ADMIN — CALCIUM CARBONATE 500 MG (1,250 MG)-VITAMIN D3 200 UNIT TABLET 1 TABLET: at 08:38

## 2018-09-05 RX ADMIN — VITAMIN D, TAB 1000IU (100/BT) 1000 UNITS: 25 TAB at 08:38

## 2018-09-05 RX ADMIN — SODIUM CHLORIDE 125 ML/HR: 900 INJECTION, SOLUTION INTRAVENOUS at 09:17

## 2018-09-05 RX ADMIN — FLUTICASONE FUROATE AND VILANTEROL TRIFENATATE 1 PUFF: 100; 25 POWDER RESPIRATORY (INHALATION) at 09:18

## 2018-09-05 RX ADMIN — MONTELUKAST SODIUM 10 MG: 10 TABLET, FILM COATED ORAL at 08:38

## 2018-09-05 RX ADMIN — Medication 10 ML: at 22:08

## 2018-09-05 RX ADMIN — SODIUM CHLORIDE 125 ML/HR: 900 INJECTION, SOLUTION INTRAVENOUS at 00:14

## 2018-09-05 RX ADMIN — SENNOSIDES AND DOCUSATE SODIUM 1 TABLET: 8.6; 5 TABLET ORAL at 08:38

## 2018-09-05 RX ADMIN — Medication 2 G: at 06:27

## 2018-09-05 RX ADMIN — ASPIRIN 325 MG: 325 TABLET, DELAYED RELEASE ORAL at 08:38

## 2018-09-05 RX ADMIN — OXYCODONE HYDROCHLORIDE 5 MG: 5 TABLET ORAL at 08:38

## 2018-09-05 NOTE — PROGRESS NOTES
Spiritual Care Partner Volunteer visited patient in Neuro on September 5, 2018.   Documented by:  TERRENCE Lazar, [facility], Chaplain THI RODNEY [facility] Paging Service  [phone])

## 2018-09-05 NOTE — PROGRESS NOTES
Problem: Mobility Impaired (Adult and Pediatric)  Goal: *Acute Goals and Plan of Care (Insert Text)  Physical Therapy Goals  Initiated 9/5/2018  1. Patient will move from supine to sit and sit to supine  in bed with supervision/set-up within 7 day(s). 2.  Patient will transfer from bed to chair and chair to bed with modified independence using the least restrictive device within 7 day(s). 3.  Patient will perform sit to stand with minimal assistance within 7 day(s). 4.  Patient will ambulate with minimal assistance/contact guard assist for 100 feet with the least restrictive device within 7 day(s). physical Therapy TREATMENT  Patient: Álvaro Fang (70 y.o. female)  Date: 9/5/2018  Diagnosis: Fracture, femur closed, shaft ([address]  LEFT INTERTROCHANTERIC FRACTURE <principal problem not specified>  Procedure(s) (LRB):  FEMUR INTRA MEDULLARY NAILING RETROGRADE Left (Left) 1 Day Post-Op  Precautions: PWB  Chart, physical therapy assessment, plan of care and goals were reviewed. ASSESSMENT:  Patient received in chair after OT session. Facilitated gait training x20' using RW with min-moderate assistance and additional time. Extensive cues required to advance the LLE first and allow sufficient space to maintain balance after a step with her right. Gait antalgic and with decreased right step length. She reported moderate to strong fatigue and increased pain after 20' and returned to supine in NAD. Progression toward goals:  [x]    Improving appropriately and progressing toward goals  []    Improving slowly and progressing toward goals  []    Not making progress toward goals and plan of care will be adjusted     PLAN:  Patient continues to benefit from skilled intervention to address the above impairments. Continue treatment per established plan of care.   Discharge Recommendations:  Rehab  Further Equipment Recommendations for Discharge:  to be determined       SUBJECTIVE:   Patient stated I feel better than I did this morning.     OBJECTIVE DATA SUMMARY:   Critical Behavior:  Neurologic State: Alert  Orientation Level: Oriented X4  Cognition: Appropriate for age attention/concentration, Follows commands     Functional Mobility Training:  Bed Mobility:     Supine to Sit: Minimum assistance; Moderate assistance     Scooting: Minimum assistance        Transfers:  Sit to Stand: Moderate assistance  Stand to Sit: Minimum assistance                             Balance:  Sitting: Intact  Standing: Impaired  Standing - Static: Fair  Standing - Dynamic : Fair  Ambulation/Gait Training:  Distance (ft): 20 Feet (ft)  Assistive Device: Gait belt;Walker, rolling  Ambulation - Level of Assistance: Moderate assistance     Gait Description (WDL): Exceptions to WDL  Gait Abnormalities: Antalgic;Decreased step clearance;Shuffling gait        Base of Support: Narrowed  Stance: Left decreased  Speed/Catherine: Pace decreased (<100 feet/min)  Step Length: Right shortened  Swing Pattern: Left asymmetrical  Therapeutic Exercises:   Supine ankle pumps, quad sets, heel slides x10 each  Pain:  Pain Scale 1: Numeric (0 - 10)  Pain Intensity 1: 7  Pain Location 1: Leg  Pain Orientation 1: Left  Pain Description 1: Aching  Pain Intervention(s) 1: Medication (see MAR)  Activity Tolerance:     Please refer to the flowsheet for vital signs taken during this treatment.   After treatment:   []    Patient left in no apparent distress sitting up in chair  [x]    Patient left in no apparent distress in bed  [x]    Call bell left within reach  [x]    Nursing notified  []    Caregiver present  []    Bed alarm activated    COMMUNICATION/COLLABORATION:   The patients plan of care was discussed with: Registered Nurse    Luke Sagastume, PT, DPT   Time Calculation: 24 mins

## 2018-09-05 NOTE — PROGRESS NOTES
Reason for Admission:  Left Femur Fracture                    RRAT Score: 8                    Plan for utilizing home health: N/A, pt recommended for [facility]                         Likelihood of Readmission: Low                          Transition of Care Plan:                    CM met with the pt to discuss dispo plan and PT/OT recs for acute rehab as the pt lives alone in her home. Pt was agreeable to referral to [facility]. CM sent referral to [facility] for review and potential admission. Care Management Interventions  PCP Verified by CM:  Yes (Pt said she last saw her PCP in early July )  Mode of Transport at Discharge: BLS  Transition of Care Consult (CM Consult): Discharge Planning  Discharge Durable Medical Equipment: No  Physical Therapy Consult: Yes  Occupational Therapy Consult: Yes  Speech Therapy Consult: No  Current Support Network: Lives Alone  Confirm Follow Up Transport: Family  Plan discussed with Pt/Family/Caregiver: Yes  Freedom of Choice Offered: Yes   Resource Information Provided?: No  Discharge Location  Discharge Placement: Rehab hospital/unit acute    Hannah Linares LCSW

## 2018-09-05 NOTE — PROGRESS NOTES
Problem: Mobility Impaired (Adult and Pediatric)  Goal: *Acute Goals and Plan of Care (Insert Text)  Physical Therapy Goals  Initiated 9/5/2018  1. Patient will move from supine to sit and sit to supine  in bed with supervision/set-up within 7 day(s). 2.  Patient will transfer from bed to chair and chair to bed with modified independence using the least restrictive device within 7 day(s). 3.  Patient will perform sit to stand with minimal assistance within 7 day(s). 4.  Patient will ambulate with minimal assistance/contact guard assist for 100 feet with the least restrictive device within 7 day(s). physical Therapy EVALUATION  Patient: Shira Uribe (70 y.o. female)  Date: 9/5/2018  Primary Diagnosis: Fracture, femur closed, shaft ([address]  LEFT INTERTROCHANTERIC FRACTURE  Procedure(s) (LRB):  FEMUR INTRA MEDULLARY NAILING RETROGRADE Left (Left) 1 Day Post-Op   Precautions:   PWB    ASSESSMENT :  Based on the objective data described below, the patient presents with increased pain, limited ROM, strength, impaired balance, and orthostatic hypotension with initial mobility following a distal femoral ORIF. This patient was independent and active while living alone prior to admission. Gait was slow and antalgic with min-moderate assistance for 2x12' using a RW. Unable to leave her in a bedside chair d/t diaphoresis, pallor, and low BP. Returned to supine in NAD and discussed with nsg. Anticipate good progress towards discharge but recommend IP rehab before returning home d/t high prior level of function and living alone. Patient will benefit from skilled intervention to address the above impairments.   Patients rehabilitation potential is considered to be Good  Factors which may influence rehabilitation potential include:   [x]         None noted  []         Mental ability/status  []         Medical condition  []         Home/family situation and support systems  []         Safety awareness  [] Pain tolerance/management  []         Other:      PLAN :  Recommendations and Planned Interventions:  [x]           Bed Mobility Training             [x]    Neuromuscular Re-Education  [x]           Transfer Training                   []    Orthotic/Prosthetic Training  [x]           Gait Training                         []    Modalities  [x]           Therapeutic Exercises           []    Edema Management/Control  [x]           Therapeutic Activities            []    Patient and Family Training/Education  []           Other (comment):    Frequency/Duration: Patient will be followed by physical therapy  twice daily to address goals. Discharge Recommendations: Inpatient Rehab  Further Equipment Recommendations for Discharge: to be determined       SUBJECTIVE:   Patient stated I feel tired.     OBJECTIVE DATA SUMMARY:   HISTORY:    Past Medical History:   Diagnosis Date    Asthma     Cancer ([facility] [address]     skin     Past Surgical History:   Procedure Laterality Date    COLONOSCOPY N/A 7/26/2016    COLONOSCOPY performed by Gary Avery MD at [facility] ENDOSCOPY    HX OTHER SURGICAL      exc skin ca     Prior Level of Function/Home Situation: independent  Personal factors and/or comorbidities impacting plan of care:     Home Situation  Home Environment: Private residence  # Steps to Enter: 3  Rails to Enter: Yes  Wheelchair Ramp: Yes  One/Two Story Residence: Two story  Living Alone: Yes  Support Systems: Family member(s)  Patient Expects to be Discharged to[de-identified] Private residence  Current DME Used/Available at Home: Safety frame [employer], Commode, bedside    EXAMINATION/PRESENTATION/DECISION MAKING:   Critical Behavior:  Neurologic State: Alert  Orientation Level: Oriented X4  Cognition: Appropriate for age attention/concentration, Follows commands     Hearing:   Auditory  Auditory Impairment: None  Skin:    Edema:   Range Of Motion:  AROM: Generally decreased, functional                       Strength:    Strength: Generally decreased, functional                    Tone & Sensation:   Tone: Normal              Sensation: Intact               Coordination:  Coordination: Within functional limits  Vision:      Functional Mobility:  Bed Mobility:     Supine to Sit: Minimum assistance; Moderate assistance     Scooting: Minimum assistance  Transfers:  Sit to Stand: Moderate assistance  Stand to Sit: Minimum assistance                       Balance:   Sitting: Intact  Standing: Impaired  Standing - Static: Fair  Standing - Dynamic : Fair  Ambulation/Gait Training:  Distance (ft): 25 Feet (ft) (2x12 with seated commode trasnfer between)  Assistive Device: Gait belt;Walker, rolling  Ambulation - Level of Assistance: Moderate assistance     Gait Description (WDL): Exceptions to WDL  Gait Abnormalities: Decreased step clearance        Base of Support: Narrowed  Stance: Left decreased  Speed/Catherine: Pace decreased (<100 feet/min)  Step Length: Right shortened  Swing Pattern: Left asymmetrical                  Stairs: Therapeutic Exercises:       Functional Measure:  Tinetti test:    Sitting Balance: 1  Arises: 0  Attempts to Rise: 0  Immediate Standing Balance: 2  Standing Balance: 2  Nudged: 1  Eyes Closed: 0  Turn 360 Degrees - Continuous/Discontinuous: 0  Turn 360 Degrees - Steady/Unsteady: 0  Sitting Down: 1  Balance Score: 7  Indication of Gait: 1  R Step Length/Height: 1  L Step Length/Height: 1  R Foot Clearance: 1  L Foot Clearance: 1  Step Symmetry: 1  Step Continuity: 1  Path: 1  Trunk: 0  Walking Time: 1  Gait Score: 9  Total Score: 16       Tinetti Test and G-code impairment scale:  Percentage of Impairment CH    0%   CI    1-19% CJ    20-39% CK    40-59% CL    60-79% CM    80-99% CN     100%   Tinetti  Score 0-28 28 23-27 17-22 12-16 6-11 1-5 0       Tinetti Tool Score Risk of Falls  <19 = High Fall Risk  19-24 = Moderate Fall Risk  25-28 = Low Fall Risk  Tinetti ME.  Performance-Oriented Assessment of Mobility Problems in Elderly Patients. [facility]; [id number]. (Scoring Description: PT Bulletin Feb. 10, 1993)    Older adults: Glennytk Sheth et al, 2009; n = 1000 [facility] elderly evaluated with ABC, SKIP, ADL, and IADL)  · Mean SKIP score for males aged 69-68 years = 26.21(3.40)  · Mean SKIP score for females age 69-68 years = 25.16(4.30)  · Mean SKIP score for males over 80 years = 23.29(6.02)  · Mean SKIP score for females over 80 years = 17.20(8.32)         G codes: In compliance with CMSs Claims Based Outcome Reporting, the following G-code set was chosen for this patient based on their primary functional limitation being treated: The outcome measure chosen to determine the severity of the functional limitation was the Tinetti with a score of 16/28 which was correlated with the impairment scale. ? Mobility - Walking and Moving Around:     - CURRENT STATUS: CK - 40%-59% impaired, limited or restricted    - GOAL STATUS: CJ - 20%-39% impaired, limited or restricted    - D/C STATUS:  ---------------To be determined---------------      Physical Therapy Evaluation Charge Determination   History Examination Presentation Decision-Making   MEDIUM  Complexity : 1-2 comorbidities / personal factors will impact the outcome/ POC  MEDIUM Complexity : 3 Standardized tests and measures addressing body structure, function, activity limitation and / or participation in recreation  LOW Complexity : Stable, uncomplicated  LOW Complexity : FOTO score of       Based on the above components, the patient evaluation is determined to be of the following complexity level: LOW     Pain:  Pain Scale 1: Numeric (0 - 10)  Pain Intensity 1: 1  Pain Location 1: Leg  Pain Orientation 1: Left  Pain Description 1: Aching  Pain Intervention(s) 1: Medication (see MAR)  Activity Tolerance:     Please refer to the flowsheet for vital signs taken during this treatment.   After treatment:   [x]         Patient left in no apparent distress sitting up in chair  []         Patient left in no apparent distress in bed  [x]         Call bell left within reach  [x]         Nursing notified  []         Caregiver present  []         Bed alarm activated    COMMUNICATION/EDUCATION:   The patients plan of care was discussed with: Registered Nurse. [x]         Fall prevention education was provided and the patient/caregiver indicated understanding. [x]         Patient/family have participated as able in goal setting and plan of care. []         Patient/family agree to work toward stated goals and plan of care. []         Patient understands intent and goals of therapy, but is neutral about his/her participation. []         Patient is unable to participate in goal setting and plan of care.     Thank you for this referral.  Braden Horne, PT, DPT   Time Calculation: 31 mins

## 2018-09-05 NOTE — PROGRESS NOTES
Initial Nutrition Assessment:    INTERVENTIONS/RECOMMENDATIONS:   · Continue current diet  · Ensure BID    ASSESSMENT:   Chart reviewed, medically noted for s/p Left femur retrograde intramedullary nail, osteoporosis and PMH shown below. We discussed the role that nutrition plays in healing after a fracture/surgery, specifically focusing on protein sources at each meal as well as nutrition supplements to help meet pt protein needs. She agreed to try ensure. She reports a good appetite currently and PTA. Past Medical History:   Diagnosis Date    Asthma     Cancer ([facility] [address]     skin       Diet Order: Regular  % Eaten:  No data found. Pertinent Medications: [x]Reviewed: NS, os-oscar, vit D3, miralax, pericolace  Pertinent Labs: [x]Reviewed:   Food Allergies: [x]NKFA  []Other   Last BM: 9/3  Edema:        []RUE   []LUE   []RLE   []LLE      Pressure Injury:      [] Stage I   [] Stage II   [] Stage III   [] Stage IV      Wt Readings from Last 30 Encounters:   09/05/18 89.4 kg (197 lb)   07/26/16 83 kg (183 lb)       Anthropometrics:   Height: 5' 5.5\" (166.4 cm) Weight: 89.4 kg (197 lb)   IBW (%IBW):   ( ) UBW (%UBW):   (  %)   Last Weight Metrics:  Weight Loss Metrics 9/5/2018 7/26/2016   Today's Wt 197 lb 183 lb   BMI 32.28 kg/m2 29.98 kg/m2       BMI: Body mass index is 32.28 kg/(m^2). This BMI is indicative of:   []Underweight    []Normal    []Overweight    [x] Obesity   [] Extreme Obesity (BMI>40)     Estimated Nutrition Needs (Based on):   1850 Kcals/day (BMR: 1425 x 1.3) , 90 g (1 g/kg) Protein  Carbohydrate:  At Least 130 g/day  Fluids: 1850 mL/day (1ml/kcal) or per primary team    NUTRITION DIAGNOSES:   Problem:  Increased nutrient needs      Etiology: related to fracture healing      Signs/Symptoms: as evidenced by s/p Left femur retrograde intramedullary nail      NUTRITION INTERVENTIONS:  Meals/Snacks: General/healthful diet   Supplements: Commercial supplement              GOAL:   consume >75% of meals and ONS in 5-7 days    LEARNING NEEDS (Diet, Food/Nutrient-Drug Interaction):    [] None Identified   [x] Identified and Education Provided/Documented   [] Identified and Pt declined/was not appropriate     Cultureal, Christianity, OR Ethnic Dietary Needs:    [x] None Identified   [] Identified and Addressed     [x] Interdisciplinary Care Plan Reviewed/Documented    [x] Discharge Planning: general healthy diet with adequate kcal and protein to promote fracture healing       MONITORING /EVALUATION:      Food/Nutrient Intake Outcomes:  Total energy intake  Physical Signs/Symptoms Outcomes: Weight/weight change, Electrolyte and renal profile, Glucose profile, GI    NUTRITION RISK:    [] High              [] Moderate           [x]  Low  []  Minimal/Uncompromised    PT SEEN FOR:    [x]  MD Consult: []Calorie Count      []Diabetic Diet Education        []Diet Education     []Electrolyte Management     [x]General Nutrition Management and Supplements     []Management of Tube Feeding     []TPN Recommendations    []  RN Referral:  []MST score >=2     []Enteral/Parenteral Nutrition PTA     []Pregnant: Gestational DM or Multigestation     []Pressure Ulcer/Wound Care needs        []  Low BMI  []  LOS Referral       Bharath Gilman RDN  Pager [phone]  Weekend Pager [phone]

## 2018-09-05 NOTE — PROGRESS NOTES
Occupational Therapy Goals  Initiated 9/5/2018  1. Patient will perform sponge bathing with supervision/set-up within 7 day(s). 2.  Patient will perform standing grooming with supervision/set-up within 7 day(s). 3.  Patient will perform lower body dressing with supervision/set-up within 7 day(s). 4.  Patient will perform toilet transfers with supervision/set-up within 7 day(s). 5.  Patient will perform all aspects of toileting with supervision/set-up within 7 day(s). Occupational Therapy EVALUATION  Patient: Valerie Grey (70 y.o. female)  Date: 9/5/2018  Primary Diagnosis: Fracture, femur closed, shaft ([facility] [address]  LEFT INTERTROCHANTERIC FRACTURE  Procedure(s) (LRB):  FEMUR INTRA MEDULLARY NAILING RETROGRADE Left (Left) 1 Day Post-Op   Precautions: LLE  PWB (50%)     ASSESSMENT :  Based on the objective data described below, the patient presents s/p L intertrochanteric fracture requiring IM nailing, now with LLE 50% WB precautions, GW, decreased activity tolerance, decreased safety awareness and decreased balance which is impairing her functional independence. Patient attempting to maintain WB precautions t/o session, but requires intermittent cueing for consistency during ADLs, and transfers. She is functioning well below her independent baseline, now performing ADLs at an independent to mod A level, and is supervision to mod A for functional mobility. Patient will benefit from skilled intervention to address the above impairments.   Patients rehabilitation potential is considered to be Good  Factors which may influence rehabilitation potential include:   []             None noted  []             Mental ability/status  []             Medical condition  []             Home/family situation and support systems  []             Safety awareness  []             Pain tolerance/management  []             Other:      PLAN :  Recommendations and Planned Interventions:  [x]               Self Care Training []        Therapeutic Activities  [x]               Functional Mobility Training    []        Cognitive Retraining  []               Therapeutic Exercises           [x]        Endurance Activities  [x]               Balance Training                   []        Neuromuscular Re-Education  []               Visual/Perceptual Training     [x]   Home Safety Training  [x]               Patient Education                 [x]        Family Training/Education  []               Other (comment):    Frequency/Duration: Patient will be followed by occupational therapy 5 times a week to address goals. Discharge Recommendations: Inpatient Rehab  Further Equipment Recommendations for Discharge: TBD         OBJECTIVE DATA SUMMARY:     Past Medical History:   Diagnosis Date    Asthma     Cancer [facility])     skin     Past Surgical History:   Procedure Laterality Date    COLONOSCOPY N/A 7/26/2016    COLON[facility] Lala [facility] ENDOSCOPY    HX OTHER SURGICAL      exc skin ca     Prior Level of Function/Home Situation: Independent with ADLs, IADLs, and functional mobility. Expanded or extensive additional review of patient history:     Home Situation  Home Environment: Private residence  # Steps to Enter: 3  Rails to Enter: Yes  Wheelchair Ramp: Yes  One/Two Story Residence: Two story  Living Alone: Yes  Support Systems: Family member(s)  Patient Expects to be Discharged to[de-identified] Private residence  Current DME Used/Available at Home: Safety frame toliet, Commode, bedside  [x]  Right hand dominant   []  Left hand dominant  Cognitive/Behavioral Status:  Neurologic State: Alert  Orientation Level: Oriented X4  Cognition: Appropriate decision making; Appropriate for age attention/concentration; Appropriate safety awareness; Follows commands        Safety/Judgement: Awareness of environment; Insight into deficits    Vision/Perceptual:    Acuity: Within Defined Limits    Corrective Lenses: Glasses  Range of Motion:  AROM: Within functional limits (with the exception of the LLE 2/2 post op pain)     Strength:  Strength: Generally decreased, functional  Coordination:  Coordination: Within functional limits          Tone & Sensation:  Tone: Normal  Balance:  Sitting: Intact  Standing: Impaired (cues to maintain 50% WB on the LLE)  Standing - Static: Fair  Standing - Dynamic : Fair  Functional Mobility and Transfers for ADLs:  Bed Mobility:     Supine to Sit: Supervision     Scooting: Supervision  Transfers:  Sit to Stand: Moderate assistance (improving to min A,  50% WB on LLE)     Bed to Chair: Minimum assistance (stand pivot with with RW, 50% WB on LLE)          Toilet Transfer : Minimum assistance (stand pivot with with RW, 50% WB on LLE, to [facility])              ADL Assessment:  Feeding: Independent    Oral Facial Hygiene/Grooming: Supervision;Setup (seated)    Bathing: Minimum assistance    Upper Body Dressing: Supervision;Setup    Lower Body Dressing: Moderate assistance    Toileting: Moderate assistance                Functional Measure:  Barthel Index:    Bathin  Bladder: 10  Bowels: 10  Groomin  Dressin  Feeding: 10  Mobility: 0  Stairs: 0  Toilet Use: 5  Transfer (Bed to Chair and Back): 5  Total: 45       Barthel and G-code impairment scale:  Percentage of impairment CH  0% CI  1-19% CJ  20-39% CK  40-59% CL  60-79% CM  80-99% CN  100%   Barthel Score 0-100 100 99-80 79-60 59-40 20-39 1-19   0   Barthel Score 0-20 20 17-19 13-16 9-12 5-8 1-4 0      The Barthel ADL Index: Guidelines  1. The index should be used as a record of what a patient does, not as a record of what a patient could do. 2. The main aim is to establish degree of independence from any help, physical or verbal, however minor and for whatever reason. 3. The need for supervision renders the patient not independent. 4. A patient's performance should be established using the best available evidence.  Asking the patient, friends/relatives and nurses are the usual sources, but direct observation and common sense are also important. However direct testing is not needed. 5. Usually the patient's performance over the preceding 24-48 hours is important, but occasionally longer periods will be relevant. 6. Middle categories imply that the patient supplies over 50 per cent of the effort. 7. Use of aids to be independent is allowed. Ericka Parikh., Barthel, D.W. (9863). Functional evaluation: the Barthel Index. [facility] (14)2. Rima Gupta peterson SOHAM Osorio, Tawanda Quintero., Phil Thorpe., [place], [address] (1999). Measuring the change indisability after inpatient rehabilitation; comparison of the responsiveness of the Barthel Index and Functional [place] Measure. Journal of Neurology, Neurosurgery, and Psychiatry, 66(4), 870-014. MATILDE Vicente, SONIA Tenorio, & Emily Molina, MHilaryA. (2004.) Assessment of post-stroke quality of life in cost-effectiveness studies: The usefulness of the Barthel Index and the EuroQoL-5D. Quality of Life Research, 13, 427-14       In compliance with CMSs Claims Based Outcome Reporting, the following G-code set was chosen for this patient based on their primary functional limitation being treated: The outcome measure chosen to determine the severity of the functional limitation was the Barthel Index with a score of 45/100 which was correlated with the impairment scale. ? Self Care:     - CURRENT STATUS: CK - 40%-59% impaired, limited or restricted    - GOAL STATUS:  CJ - 20%-39% impaired, limited or restricted    - D/C STATUS:  ---------------To be determined---------------         ADL Intervention and task modifications:  Initiated bed mobility, LB dressing, transfer, toileting and safety training, with focus on maintaining LLE 50% WB.       Pain:  Pain Scale 1: Numeric (0 - 10)  Pain Intensity 1: 1  Pain Location 1: Leg  Pain Orientation 1: Left  Pain Description 1: Aching  Pain Intervention(s) 1: Medication (see MAR)  Activity Tolerance:   BP   Supine: 136/48  Sittin/47  Sitting s/p isometric abdominal exercise: 146/51  Standin/49  Seated in chair s/p activity: 115/56  No complaint of dizziness or feeling light head with positional changes. After treatment:   [x] Patient left in no apparent distress sitting up in chair  [] Patient left in no apparent distress in bed  [x] Call bell left within reach  [x] Nursing notified  [] Caregiver present  [] Bed alarm activated    COMMUNICATION/EDUCATION:   The patients plan of care was discussed with: Physical Therapist and Registered Nurse. [x] Home safety education was provided and the patient/caregiver indicated understanding. [x] Patient/family have participated as able in goal setting and plan of care. [x] Patient/family agree to work toward stated goals and plan of care. [] Patient understands intent and goals of therapy, but is neutral about his/her participation. [] Patient is unable to participate in goal setting and plan of care. This patients plan of care is appropriate for delegation to [facility].     Thank you for this referral.  Tan Horn, OTR/L  Time Calculation: 47 mins

## 2018-09-05 NOTE — PROGRESS NOTES
Hospitalist Progress Note    NAME: Iban Asher   :  1950   MRN:  [id number]     Admit date: 9/3/2018    Today's date: 18    PCP: Tolbert Galeazzi, MD Cassie Maze, M.D. Cell [phone]      Assessment / Plan:    Left  mid/distal Femur fracture POA s/p mechanical fall at home  Osteoporosis POA  Increased BP/accelerated HTN POA improved  CXR neg  EKG= NSR,no acute changes  UA neg for infection except blood & ketones  LFTs normal  H/o long term bisphosphonate therapy >5 yrs aggregate (last cycle since past 3 yrs)   IV hydralazine prn  Incentive spirometry perioperatively  Cont Ca+ Vit D3 for now     Hyperlipidemia  Asthma POA not wheezing  Cont statin  Cont home Breo, Singulair     H/o Skin Ca  Family h/o colon ca in mother    Obesity POA Body mass index is 30.81 kg/(m^2).     Code Status: Full     Surrogate Decision maker: Sister Lisette Pierce # [phone]    DVT Prophylaxis: SQ Heparin x 1 now, SCDs there after till post        Subjective:     Chief Complaint / Reason for Physician Visit  \"My left leg pain is less severe\". Discussed with RN events overnight. Seen post op in PACU  Less pain in left hip  O2 sats dropped after IV fentanyl in PACU  No CP or SOB or N/V    Review of Systems:  Symptom Y/N Comments  Symptom Y/N Comments   Fever/Chills n   Chest Pain n    Poor Appetite    Edema     Cough n   Abdominal Pain n    Sputum    Joint Pain     SOB/CHANEY n   Headache     Nausea/vomit n   Tolerating PT/OT     Diarrhea n   Tolerating Diet n NPO   Constipation    Other       Could NOT obtain due to:      Objective:     VITALS:   Last 24hrs VS reviewed since prior progress note.  Most recent are:  Patient Vitals for the past 24 hrs:   Temp Pulse Resp BP SpO2   18 1952 98.1 °F (36.7 °C) 70 16 139/49 95 %   18 1823 98.1 °F (36.7 °C) 72 16 146/63 100 %   18 1800 97.8 °F (36.6 °C) 75 18 138/63 100 %   18 1745 - 73 16 147/55 99 %   18 1730 - 73 16 134/45 97 %   09/04/18 1715 - 72 19 145/42 95 %   09/04/18 1700 - 74 22 145/72 96 %   09/04/18 1645 - 75 18 154/53 95 %   09/04/18 1630 - 74 22 141/55 93 %   09/04/18 1615 - 79 18 160/44 94 %   09/04/18 1610 - 80 18 173/53 93 %   09/04/18 1605 - 83 19 186/59 92 %   09/04/18 1602 98.5 °F (36.9 °C) 87 16 187/62 -   09/04/18 1600 98.5 °F (36.9 °C) 87 15 187/62 95 %   09/04/18 1327 98 °F (36.7 °C) 73 19 160/59 98 %   09/04/18 1215 97.5 °F (36.4 °C) 64 - 148/68 98 %   09/04/18 0739 98 °F (36.7 °C) 70 18 147/47 99 %   09/04/18 0402 97.9 °F (36.6 °C) 66 16 136/51 100 %   09/04/18 0010 97.9 °F (36.6 °C) 72 18 124/43 99 %       Intake/Output Summary (Last 24 hours) at 09/04/18 2314  Last data filed at 09/04/18 1800   Gross per 24 hour   Intake          1536.25 ml   Output             1330 ml   Net           206.25 ml        Wt Readings from Last 12 Encounters:   09/03/18 85.3 kg (188 lb)   07/26/16 83 kg (183 lb)       PHYSICAL EXAM:  General: WD, WN. Alert, cooperative, no acute distress    EENT:  PERRL. Anicteric sclerae. MMM  Resp:  CTA bilaterally, no wheezing or rales. No accessory muscle use  CV:  Regular  rhythm,  No edema  GI:  Soft, Non distended, Non tender.  +Bowel sounds, no rebound  Neurologic:  Alert and oriented X 3, normal speech, non focal motor exam  Psych:   Good insight. Not anxious nor agitated  Skin:  No rashes. No jaundice    Reviewed most current lab test results and cultures  YES  Reviewed most current radiology test results   YES  Review and summation of old records today    NO  Reviewed patient's current orders and MAR    YES  PMH/SH reviewed - no change compared to H&P  ________________________________________________________________________  Care Plan discussed with:    Comments   Patient x    Family      RN x    Care Manager     Consultant                        Multidiciplinary team rounds were held today with , nursing, pharmacist and clinical coordinator.   Patient's plan of care was discussed; medications were reviewed and discharge planning was addressed. ________________________________________________________________________  Total NON critical care TIME:  25   Minutes    Total CRITICAL CARE TIME Spent:   Minutes non procedure based      Comments   >50% of visit spent in counseling and coordination of care     ________________________________________________________________________  Kvng Silverio MD     Procedures: see electronic medical records for all procedures/Xrays and details which were not copied into this note but were reviewed prior to creation of Plan. LABS:  I reviewed today's most current labs and imaging studies.   Pertinent labs include:  Recent Labs      09/03/18   1458   WBC  7.4   HGB  12.5   HCT  36.6   PLT  227     Recent Labs      09/03/18   1458   NA  132*   K  3.9   CL  98   CO2  25   GLU  107*   BUN  9   CREA  0.75   CA  9.1   ALB  3.8   TBILI  0.5   SGOT  16   ALT  28   INR  1.0

## 2018-09-05 NOTE — PROGRESS NOTES
Hospitalist Progress Note    NAME: Lonie Schwab   :  1950   MRN:  [id number]     Admit date: 9/3/2018    Today's date: 18    PCP: MD Dominikc Pena M.D. Cell [phone]      Assessment / Plan:     Orthostatic hypotension today  Symptomatic working with PT, SBP dropped to 78  IVF  Serial Hgbs, transfuse as needed  Reassess later today    Hyponatremia Na 132 POA  Improving with IVF  Serial labs    Left  mid/distal Femur fracture POA s/p mechanical fall at home  Osteoporosis POA  Increased BP/accelerated HTN POA  H/o long term bisphosphonate therapy >5 yrs aggregate (last cycle since past 3 yrs)   IV hydralazine prn  Incentive spirometry perioperatively  Cont Ca+ Vit D3 for now  Post op care per orthopedics     Hyperlipidemia  Asthma POA not wheezing  Cont statin  Cont home Breo, Singulair     H/o Skin Ca  Family h/o colon ca in mother    Obesity POA Body mass index is 32.28 kg/(m^2).     Code Status: Full     Surrogate Decision maker: Sister Oneyda Canas # [phone]    DVT Prophylaxis: SQ Heparin x 1 now, SCDs there after till post        Subjective:     Chief Complaint / Reason for Physician Visit  \" I feel lightheaded\". Discussed with RN events overnight. Seen when working with PT, felt lightheaded and sweaty, dropped SBP to 78, improved laying down to 103  Bp otherwise has been stable  Mild SOB  No Cp or N/V or diarrhea or abdominal pain    Review of Systems:  Symptom Y/N Comments  Symptom Y/N Comments   Fever/Chills n   Chest Pain n    Poor Appetite    Edema     Cough n   Abdominal Pain n    Sputum    Joint Pain     SOB/CHANEY n   Headache     Nausea/vomit n   Tolerating PT/OT     Diarrhea n   Tolerating Diet y    Constipation    Other       Could NOT obtain due to:      Objective:     VITALS:   Last 24hrs VS reviewed since prior progress note.  Most recent are:  Patient Vitals for the past 24 hrs:   Temp Pulse Resp BP SpO2   18 0746 98.9 °F (37.2 °C) 76 16 150/54 96 %   09/05/18 0410 98.4 °F (36.9 °C) 81 14 133/65 99 %   09/05/18 0018 98.5 °F (36.9 °C) 74 18 130/45 97 %   09/04/18 2300 - 71 16 130/45 100 %   09/04/18 2230 - 71 16 130/47 98 %   09/04/18 2200 - 72 16 141/54 98 %   09/04/18 2130 - 77 16 (!) 131/105 99 %   09/04/18 2100 - 74 16 140/56 97 %   09/04/18 2030 - 74 16 123/59 100 %   09/04/18 1952 98.1 °F (36.7 °C) 70 16 139/49 95 %   09/04/18 1823 98.1 °F (36.7 °C) 72 16 146/63 100 %   09/04/18 1800 97.8 °F (36.6 °C) 75 18 138/63 100 %   09/04/18 1745 - 73 16 147/55 99 %   09/04/18 1730 - 73 16 134/45 97 %   09/04/18 1715 - 72 19 145/42 95 %   09/04/18 1700 - 74 22 145/72 96 %   09/04/18 1645 - 75 18 154/53 95 %   09/04/18 1630 - 74 22 141/55 93 %   09/04/18 1615 - 79 18 160/44 94 %   09/04/18 1610 - 80 18 173/53 93 %   09/04/18 1605 - 83 19 186/59 92 %   09/04/18 1602 98.5 °F (36.9 °C) 87 16 187/62 -   09/04/18 1600 98.5 °F (36.9 °C) 87 15 187/62 95 %   09/04/18 1327 98 °F (36.7 °C) 73 19 160/59 98 %   09/04/18 1215 97.5 °F (36.4 °C) 64 - 148/68 98 %       Intake/Output Summary (Last 24 hours) at 09/05/18 1205  Last data filed at 09/05/18 0707   Gross per 24 hour   Intake          3215.83 ml   Output             2030 ml   Net          1185.83 ml        Wt Readings from Last 12 Encounters:   09/05/18 89.4 kg (197 lb)   07/26/16 83 kg (183 lb)       PHYSICAL EXAM:  General: WD, WN. Alert, cooperative, no acute distress    EENT:  PERRL. Anicteric sclerae. MMM  Resp:  CTA bilaterally, no wheezing or rales. No accessory muscle use  CV:  Regular  rhythm,  No edema  GI:  Soft, Non distended, Non tender.  +Bowel sounds, no rebound  Neurologic:  Alert and oriented X 3, normal speech, non focal motor exam  Psych:   Good insight. Not anxious nor agitated  Skin:  No rashes.   No jaundice    Reviewed most current lab test results and cultures  YES  Reviewed most current radiology test results   YES  Review and summation of old records today    NO  Reviewed patient's current orders and MAR    YES  PMH/SH reviewed - no change compared to H&P  ________________________________________________________________________  Care Plan discussed with:    Comments   Patient x    Family      RN x    Care Manager     Consultant                        Multidiciplinary team rounds were held today with , nursing, pharmacist and clinical coordinator. Patient's plan of care was discussed; medications were reviewed and discharge planning was addressed. ________________________________________________________________________  Total NON critical care TIME:  25   Minutes    Total CRITICAL CARE TIME Spent:   Minutes non procedure based      Comments   >50% of visit spent in counseling and coordination of care     ________________________________________________________________________  Josh Kenyon MD     Procedures: see electronic medical records for all procedures/Xrays and details which were not copied into this note but were reviewed prior to creation of Plan. LABS:  I reviewed today's most current labs and imaging studies.   Pertinent labs include:  Recent Labs      09/05/18   0431 09/03/18   1458   WBC   --   7.4   HGB  8.8*  12.5   HCT   --   36.6   PLT   --   227     Recent Labs      09/05/18   0431 09/03/18   1458   NA  134*  132*   K  3.9  3.9   CL  105  98   CO2  22  25   GLU  114*  107*   BUN  5*  9   CREA  0.38*  0.75   CA  8.0*  9.1   ALB   --   3.8   TBILI   --   0.5   SGOT   --   16   ALT   --   28   INR   --   1.0

## 2018-09-05 NOTE — PROGRESS NOTES
Pod 1 imn pain managed. No pt yet    Patient Vitals for the past 24 hrs:   Temp Pulse Resp BP SpO2   09/05/18 0746 98.9 °F (37.2 °C) 76 16 150/54 96 %   09/05/18 0410 98.4 °F (36.9 °C) 81 14 133/65 99 %   09/05/18 0018 98.5 °F (36.9 °C) 74 18 130/45 97 %   09/04/18 2300 - 71 16 130/45 100 %   09/04/18 2230 - 71 16 130/47 98 %   09/04/18 2200 - 72 16 141/54 98 %   09/04/18 2130 - 77 16 (!) 131/105 99 %   09/04/18 2100 - 74 16 140/56 97 %   09/04/18 2030 - 74 16 123/59 100 %   09/04/18 1952 98.1 °F (36.7 °C) 70 16 139/49 95 %   09/04/18 1823 98.1 °F (36.7 °C) 72 16 146/63 100 %   09/04/18 1800 97.8 °F (36.6 °C) 75 18 138/63 100 %   09/04/18 1745 - 73 16 147/55 99 %   09/04/18 1730 - 73 16 134/45 97 %   09/04/18 1715 - 72 19 145/42 95 %   09/04/18 1700 - 74 22 145/72 96 %   09/04/18 1645 - 75 18 154/53 95 %   09/04/18 1630 - 74 22 141/55 93 %   09/04/18 1615 - 79 18 160/44 94 %   09/04/18 1610 - 80 18 173/53 93 %   09/04/18 1605 - 83 19 186/59 92 %   09/04/18 1602 98.5 °F (36.9 °C) 87 16 187/62 -   09/04/18 1600 98.5 °F (36.9 °C) 87 15 187/62 95 %   09/04/18 1327 98 °F (36.7 °C) 73 19 160/59 98 %   09/04/18 1215 97.5 °F (36.4 °C) 64 - 148/68 98 %     Dressings clean and dry  Musculoskeletal: Vinayak's sign negative in bilateral lower extremities. Calves soft, supple, non-tender upon palpation or with passive stretch.      oob with PT- TTWB = TOE TOUCH WEIGHT BEARING   Asa for dvt

## 2018-09-05 NOTE — ROUTINE PROCESS
Bedside and Verbal shift change report given to Benigno De Guzman (oncoming nurse) by Romeo Betancur (offgoing nurse). Report included the following information SBAR, Kardex, Intake/Output, MAR and Recent Results.

## 2018-09-06 ENCOUNTER — HOSPITAL ENCOUNTER (OUTPATIENT)
Dept: REHABILITATION | Age: 68
End: 2018-09-18
Attending: PHYSICAL MEDICINE & REHABILITATION | Admitting: PHYSICAL MEDICINE & REHABILITATION

## 2018-09-06 VITALS
TEMPERATURE: 98.3 F | SYSTOLIC BLOOD PRESSURE: 103 MMHG | BODY MASS INDEX: 31.66 KG/M2 | HEART RATE: 80 BPM | HEIGHT: 66 IN | RESPIRATION RATE: 16 BRPM | DIASTOLIC BLOOD PRESSURE: 49 MMHG | WEIGHT: 197 LBS | OXYGEN SATURATION: 98 %

## 2018-09-06 LAB
ANION GAP SERPL CALC-SCNC: 6 MMOL/L (ref 5–15)
BUN SERPL-MCNC: 8 MG/DL (ref 6–20)
BUN/CREAT SERPL: 24 (ref 12–20)
CALCIUM SERPL-MCNC: 7.9 MG/DL (ref 8.5–10.1)
CHLORIDE SERPL-SCNC: 106 MMOL/L (ref 97–108)
CO2 SERPL-SCNC: 26 MMOL/L (ref 21–32)
CREAT SERPL-MCNC: 0.33 MG/DL (ref 0.55–1.02)
GLUCOSE SERPL-MCNC: 101 MG/DL (ref 65–100)
HGB BLD-MCNC: 7.9 G/DL (ref 11.5–16)
POTASSIUM SERPL-SCNC: 3.5 MMOL/L (ref 3.5–5.1)
SODIUM SERPL-SCNC: 138 MMOL/L (ref 136–145)

## 2018-09-06 PROCEDURE — 97116 GAIT TRAINING THERAPY: CPT

## 2018-09-06 PROCEDURE — 85018 HEMOGLOBIN: CPT | Performed by: ORTHOPAEDIC SURGERY

## 2018-09-06 PROCEDURE — 74011250637 HC RX REV CODE- 250/637: Performed by: INTERNAL MEDICINE

## 2018-09-06 PROCEDURE — 80048 BASIC METABOLIC PNL TOTAL CA: CPT | Performed by: INTERNAL MEDICINE

## 2018-09-06 PROCEDURE — 94760 N-INVAS EAR/PLS OXIMETRY 1: CPT

## 2018-09-06 PROCEDURE — 97530 THERAPEUTIC ACTIVITIES: CPT

## 2018-09-06 PROCEDURE — 36415 COLL VENOUS BLD VENIPUNCTURE: CPT | Performed by: ORTHOPAEDIC SURGERY

## 2018-09-06 PROCEDURE — 74011250637 HC RX REV CODE- 250/637: Performed by: ORTHOPAEDIC SURGERY

## 2018-09-06 PROCEDURE — 74011250637 HC RX REV CODE- 250/637: Performed by: PHYSICIAN ASSISTANT

## 2018-09-06 PROCEDURE — 74011000250 HC RX REV CODE- 250: Performed by: ORTHOPAEDIC SURGERY

## 2018-09-06 RX ORDER — OXYCODONE HYDROCHLORIDE 5 MG/1
5 TABLET ORAL
Qty: 30 TAB | Refills: 0 | Status: SHIPPED | OUTPATIENT
Start: 2018-09-06 | End: 2021-10-21 | Stop reason: CLARIF

## 2018-09-06 RX ORDER — ASPIRIN 325 MG
325 TABLET, DELAYED RELEASE (ENTERIC COATED) ORAL EVERY 12 HOURS
Qty: 60 TAB | Refills: 0 | Status: SHIPPED | OUTPATIENT
Start: 2018-09-06 | End: 2021-10-21 | Stop reason: CLARIF

## 2018-09-06 RX ADMIN — SENNOSIDES AND DOCUSATE SODIUM 1 TABLET: 8.6; 5 TABLET ORAL at 08:43

## 2018-09-06 RX ADMIN — POLYETHYLENE GLYCOL 3350 17 G: 17 POWDER, FOR SOLUTION ORAL at 08:42

## 2018-09-06 RX ADMIN — ASPIRIN 325 MG: 325 TABLET, DELAYED RELEASE ORAL at 08:43

## 2018-09-06 RX ADMIN — CALCIUM CARBONATE 500 MG (1,250 MG)-VITAMIN D3 200 UNIT TABLET 1 TABLET: at 11:46

## 2018-09-06 RX ADMIN — Medication 10 ML: at 14:30

## 2018-09-06 RX ADMIN — OXYCODONE HYDROCHLORIDE 2.5 MG: 5 TABLET ORAL at 10:34

## 2018-09-06 RX ADMIN — FLUTICASONE PROPIONATE 2 SPRAY: 50 SPRAY, METERED NASAL at 08:44

## 2018-09-06 RX ADMIN — Medication 10 ML: at 05:34

## 2018-09-06 RX ADMIN — SENNOSIDES AND DOCUSATE SODIUM 1 TABLET: 8.6; 5 TABLET ORAL at 01:12

## 2018-09-06 RX ADMIN — CALCIUM CARBONATE 500 MG (1,250 MG)-VITAMIN D3 200 UNIT TABLET 1 TABLET: at 08:43

## 2018-09-06 RX ADMIN — ACETAMINOPHEN 650 MG: 325 TABLET ORAL at 11:46

## 2018-09-06 RX ADMIN — FLUTICASONE FUROATE AND VILANTEROL TRIFENATATE 1 PUFF: 100; 25 POWDER RESPIRATORY (INHALATION) at 08:44

## 2018-09-06 RX ADMIN — ACETAMINOPHEN 650 MG: 325 TABLET ORAL at 05:34

## 2018-09-06 RX ADMIN — VITAMIN D, TAB 1000IU (100/BT) 1000 UNITS: 25 TAB at 08:43

## 2018-09-06 RX ADMIN — MONTELUKAST SODIUM 10 MG: 10 TABLET, FILM COATED ORAL at 08:43

## 2018-09-06 RX ADMIN — ACETAMINOPHEN 650 MG: 325 TABLET ORAL at 01:08

## 2018-09-06 NOTE — PROGRESS NOTES
Pt has been accepted to [facility] today and they are able to accept pt today after 1400. Call report number for floor nurse is [phone]. CM advised floor nurse and pt. Pt will be transported via [facility] transport to [facility]. Care Management Interventions  PCP Verified by CM:  Yes (Pt said she last saw her PCP in early July )  Mode of Transport at Discharge: BLS  Transition of Care Consult (CM Consult): Discharge Planning  Discharge Durable Medical Equipment: No  Physical Therapy Consult: Yes  Occupational Therapy Consult: Yes  Speech Therapy Consult: No  Current Support Network: Lives Alone  Confirm Follow Up Transport: Family  Plan discussed with Pt/Family/Caregiver: Yes  Freedom of Choice Offered: Yes  [address] Provided?: No  Discharge Location  Discharge Placement: Rehab hospital/unit acute    LATOYA Bob, [address] Manager   [phone]

## 2018-09-06 NOTE — PROGRESS NOTES
Gave report to Gwen Rose RN at [facility]. Patient will go by transport this afternoon.   Alexandra Gonzales

## 2018-09-06 NOTE — DISCHARGE INSTRUCTIONS
Follow up appointments  Call Cesilia Mckinney at [phone] to schedule follow up appt with Dr. Satya Freitas in  10 - 14 days. When to call your Orthopaedic Surgeon:  -unrelieved pain  -Signs of infection-if your incision is red; continues to have drainage; drainage has a foul odor or if you have a persistent fever over 101 degrees  -Signs of a blood clot in your leg-calf pain, tenderness, redness, swelling of lower leg  When to call your Primary Care Physician:  -Concerns about medical conditions such as diabetes, high blood pressure, asthma, congestive heart failure  -Call if blood sugars are elevated, persistent headache or dizziness, coughing or congestion, constipation or diarrhea, burning with urination, abnormal heart rate  When to call 911and go to the nearest emergency room  -acute onset of chest pain, shortness of breath, difficulty breathing    Activity- toe touch weight bearing    Incision Care- keep clean and dry    Preventing blood clots- asa 325mg bid      Pain management  -take pain medication as prescribed; decrease the amount you use as your pain lessens  -avoid alcoholic beverages while taking pain medication  -Please be aware that many medications contain Tylenol. We do not want you to over medicate so please read the information below as a guide. Do not take more than 4 Grams of Tylenol in a 24 hour period. (There are 1000 milligrams in one Gram)  Percocet contains 325 mg of Tylenol per tablet (do not take more than 12 tablets in 24 hours)  Lortab contains 500 mg of Tylenol per tablet (do not take more than 8 tablets in 24 hours)  Norco contains 325 mg of Tylenol per tablet (do not take more than 12 tablets in 24 hours). -You may place an ice bag on your affected extremity     Diet  -resume usual diet; drink plenty of fluids; eat foods high in fiber  -you may want to take a stool softener (such as Senokot-S or Colace) to prevent constipation while you are taking pain medication.   If constipation occurs, take a laxative (such as Dulcolax tablets, Milk of Magnesia, or a suppository)

## 2018-09-06 NOTE — PROGRESS NOTES
Pt is a 71 y/o  female admitted for Closed Left Femur Fracture; Pt lives alone in a two story home with primary access on the main level. Pt has a ramp to the entrance of her residence. Pt is independent with ADLs to include driving at baseline. Pt has no previous HH, SNF or DME. Pt prefers to use [facility] in [place], [place]. Pt will be transported by [id number] transport to [facility] at discharge. CM met with pt to complete initial assessment. Pt is alert and oriented to person, place,time and situation. CM will continue to follow pt to assist with discharge plans as needed. CM will continue to follow [facility] referral for their determination for post acute care provider. Care Management Interventions  PCP Verified by CM:  Yes (Pt said she last saw her PCP in early July )  Mode of Transport at Discharge: BLS  Transition of Care Consult (CM Consult): Discharge Planning  Discharge Durable Medical Equipment: No  Physical Therapy Consult: Yes  Occupational Therapy Consult: Yes  Speech Therapy Consult: No  Current Support Network: Lives Alone  Confirm Follow Up Transport: Family  Plan discussed with Pt/Family/Caregiver: Yes  Freedom of Choice Offered: Yes  [address] Provided?: No  Discharge Location  Discharge Placement: Rehab hospital/unit acute    LATOYA Yip, [address] Manager   [phone]

## 2018-09-06 NOTE — PROGRESS NOTES
Po IMN. Pain managed. Working with pt    Patient Vitals for the past 24 hrs:   Temp Pulse Resp BP SpO2   09/06/18 0755 98 °F (36.7 °C) 74 16 136/48 97 %   09/06/18 0306 96.8 °F (36 °C) 84 16 139/63 93 %   09/1950 98.3 °F (36.8 °C) 86 18 110/60 96 %   09/05/18 1612 98.8 °F (37.1 °C) 78 18 133/49 100 %   09/05/18 1331 97.7 °F (36.5 °C) 78 16 125/49 97 %   09/05/18 1158 - - - 103/51 -   09/05/18 1155 - - - (!) 78/42 -     Wounds clean and dry  Musculoskeletal: Vinayak's sign negative in bilateral lower extremities. Calves soft, supple, non-tender upon palpation or with passive stretch.      Pt  Dc plan- when approved nora cook

## 2018-09-06 NOTE — PROGRESS NOTES
Hospitalist Progress Note    NAME: Taisha Guardado   :  1950   MRN:  [id number]     Admit date: 9/3/2018    Today's date: 18    PCP: MD Angelica Hopkins M.D. Cell [phone]      Assessment / Plan:     Orthostatic hypotension  now resolved  Symptomatic working with PT, SBP dropped to 78  IVF  Serial Hgbs stable  No recurrence today  ? Mild vasovagal reaction getting OOB first time    Hyponatremia Na 132 POA  Resolved with IVF, now 138  Serial labs    Left  mid/distal Femur fracture POA s/p mechanical fall at home  Osteoporosis POA  Increased BP/accelerated HTN POA  H/o long term bisphosphonate therapy >5 yrs aggregate (last cycle since past 3 yrs)   IV hydralazine prn  Incentive spirometry perioperatively  Cont Ca+ Vit D3 for now  Post op care per orthopedics     Hyperlipidemia  Asthma POA not wheezing  Cont statin  Cont home Breo, Singulair     H/o Skin Ca  Family h/o colon ca in mother    Obesity POA Body mass index is 32.28 kg/(m^2).     Code Status: Full     Surrogate Decision maker: Sister Yoni Speaks # [phone]    DVT Prophylaxis per orthopedics       Subjective:     Chief Complaint / Reason for Physician Visit  \"no more feeling lightheaded\". Discussed with RN events overnight. No further lightheadedness or pre syncope or sweats, up to chair several times  No Cp or N/V or diarrhea or abdominal pain  Going to [facility] today    Review of Systems:  Symptom Y/N Comments  Symptom Y/N Comments   Fever/Chills n   Chest Pain n    Poor Appetite    Edema     Cough n   Abdominal Pain n    Sputum    Joint Pain     SOB/CHANEY n   Headache     Nausea/vomit n   Tolerating PT/OT     Diarrhea n   Tolerating Diet y    Constipation    Other       Could NOT obtain due to:      Objective:     VITALS:   Last 24hrs VS reviewed since prior progress note.  Most recent are:  Patient Vitals for the past 24 hrs:   Temp Pulse Resp BP SpO2   18 1141 98.3 °F (36.8 °C) 80 16 103/49 - 09/06/18 1124 - - - 140/59 -   09/06/18 1122 - 91 - 116/62 98 %   09/06/18 1117 - 94 - 125/63 -   09/06/18 1112 - 88 - 143/68 99 %   09/06/18 1034 - 79 - 147/56 99 %   09/06/18 0755 98 °F (36.7 °C) 74 16 136/48 97 %   09/06/18 0306 96.8 °F (36 °C) 84 16 139/63 93 %   09/1950 98.3 °F (36.8 °C) 86 18 110/60 96 %   09/05/18 1612 98.8 °F (37.1 °C) 78 18 133/49 100 %       Intake/Output Summary (Last 24 hours) at 09/06/18 1503  Last data filed at 09/06/18 1569   Gross per 24 hour   Intake                0 ml   Output              700 ml   Net             -700 ml        Wt Readings from Last 12 Encounters:   09/05/18 89.4 kg (197 lb)   07/26/16 83 kg (183 lb)       PHYSICAL EXAM:  General: WD, WN. Alert, cooperative, no acute distress    Neurologic:  Alert and oriented X 3, normal speech  Psych:   Good insight. Not anxious nor agitated  Skin:  No rashes. No jaundice    Reviewed most current lab test results and cultures  YES  Reviewed most current radiology test results   YES  Review and summation of old records today    NO  Reviewed patient's current orders and MAR    YES  PMH/ reviewed - no change compared to H&P  ________________________________________________________________________  Care Plan discussed with:    Comments   Patient x    Family      RN x    Care Manager     Consultant                        Multidiciplinary team rounds were held today with , nursing, pharmacist and clinical coordinator. Patient's plan of care was discussed; medications were reviewed and discharge planning was addressed.      ________________________________________________________________________  Total NON critical care TIME:  15   Minutes    Total CRITICAL CARE TIME Spent:   Minutes non procedure based      Comments   >50% of visit spent in counseling and coordination of care     ________________________________________________________________________  Marilyn Del, MD     Procedures: see electronic medical records for all procedures/Xrays and details which were not copied into this note but were reviewed prior to creation of Plan. LABS:  I reviewed today's most current labs and imaging studies.   Pertinent labs include:  Recent Labs      09/06/18   0556  09/05/18   1248  09/05/18   0431   WBC   --   7.8   --    HGB  7.9*  8.9*  8.8*   HCT   --   26.5*   --    PLT   --   152   --      Recent Labs      09/06/18   0556  09/05/18   0431   NA  138  134*   K  3.5  3.9   CL  106  105   CO2  26  22   GLU  101*  114*   BUN  8  5*   CREA  0.33*  0.38*   CA  7.9*  8.0*

## 2018-09-06 NOTE — PROGRESS NOTES
Problem: Falls - Risk of  Goal: *Absence of Falls  Document Clarence Fall Risk and appropriate interventions in the flowsheet. Outcome: Progressing Towards Goal  Fall Risk Interventions:  Mobility Interventions: Communicate number of staff needed for ambulation/transfer, Patient to call before getting OOB, Utilize walker, cane, or other assistive device         Medication Interventions: Patient to call before getting OOB, Teach patient to arise slowly    Elimination Interventions: Call light in reach, Patient to call for help with toileting needs    History of Falls Interventions: Evaluate medications/consider consulting pharmacy        Problem: Pressure Injury - Risk of  Goal: *Prevention of pressure injury  Document Shan Scale and appropriate interventions in the flowsheet.    Outcome: Progressing Towards Goal  Pressure Injury Interventions:  Sensory Interventions: Check visual cues for pain, Float heels, Maintain/enhance activity level, Pressure redistribution bed/mattress (bed type)    Moisture Interventions: Moisture barrier, Absorbent underpads    Activity Interventions: Increase time out of bed, Pressure redistribution bed/mattress(bed type), PT/OT evaluation    Mobility Interventions: HOB 30 degrees or less, Pressure redistribution bed/mattress (bed type), PT/OT evaluation    Nutrition Interventions: Document food/fluid/supplement intake    Friction and Shear Interventions: Foam dressings/transparent film/skin sealants, HOB 30 degrees or less, Lift sheet

## 2018-09-06 NOTE — PROGRESS NOTES
Problem: Mobility Impaired (Adult and Pediatric)  Goal: *Acute Goals and Plan of Care (Insert Text)  Physical Therapy Goals  Initiated 9/5/2018  1. Patient will move from supine to sit and sit to supine  in bed with supervision/set-up within 7 day(s). 2.  Patient will transfer from bed to chair and chair to bed with modified independence using the least restrictive device within 7 day(s). 3.  Patient will perform sit to stand with minimal assistance within 7 day(s). 4.  Patient will ambulate with minimal assistance/contact guard assist for 100 feet with the least restrictive device within 7 day(s). physical Therapy TREATMENT  Patient: Edmundo Berg (70 y.o. female)  Date: 9/6/2018  Diagnosis: Fracture, femur closed, shaft ([address]  LEFT INTERTROCHANTERIC FRACTURE <principal problem not specified>  Procedure(s) (LRB):  FEMUR INTRA MEDULLARY NAILING RETROGRADE Left (Left) 2 Days Post-Op  Precautions: TTWB per MD progress note; Falls  Chart, physical therapy assessment, plan of care and goals were reviewed. ASSESSMENT:  Dwaine Giordano presents with improving activity tolerance and functional mobility. She requires decreased assistance for transfers and flat surface ambulation as below. BP continues to fluctuate with activity however stabilizes with seated rest, thus she remains out of bed in chair following treatment. She denies presyncopal symptoms throughout encounter and demonstrates eagerness to learn appropriate techniques. She offers excellent return demonstration for bed mobility, transfer, and gait techniques as below. She appears highly motivated to improve mobility and return to baseline activities in community. Given improving activity tolerance she is more likely to tolerate 3 hours therapy/day than she has been previously.    Patient Vitals for the past 4 hrs:    Pulse Resp BP SpO2   09/06/18 1124 Sitting, post activity x 2 mins - - 140/59 -   09/06/18 1122 Sitting, post activity 91 - 116/62 98 %   09/06/18 1117 standing 94 - 125/63 -   09/06/18 1112 sitting 88 - 143/68 99 %   09/06/18 1034 Supine at rest 79 - 147/56 99 %                     Progression toward goals:  [x]    Improving appropriately and progressing toward goals  []    Improving slowly and progressing toward goals  []    Not making progress toward goals and plan of care will be adjusted     PLAN:  Patient continues to benefit from skilled intervention to address the above impairments. Continue treatment per established plan of care. Discharge Recommendations:  Rehab  Further Equipment Recommendations for Discharge:  Defer to rehab     SUBJECTIVE:   Patient stated Now instruct me on how to do this, re: transfers. Pt received supine, agreeable to PT and cleared by RN. She reports performing bed to commode transfers x 2 prior to PT encounter today. Sister present throughout encounter. OBJECTIVE DATA SUMMARY:   Critical Behavior:  Neurologic State: Alert  Orientation Level: Oriented X4  Cognition: Appropriate for age attention/concentration, Appropriate safety awareness, Follows commands  Safety/Judgement: Awareness of environment, Insight into deficits  Functional Mobility Training:  Bed Mobility:     Supine to Sit: Additional time; Moderate assistance (for surgical extremity only)     Scooting: Additional time;Supervision        Transfers:  Sit to Stand: Additional time;Minimum assistance;Assist x2; cues for techniques with good return demonstration  Stand to Sit: Additional time;Minimum assistance;Assist x2                             Balance:  Sitting: Without support  Standing: With support  Standing - Static: Fair  Standing - Dynamic : Fair  Ambulation/Gait Training:  Distance (ft): 8 Feet (ft)  Assistive Device: Walker, rolling;Gait belt  Ambulation - Level of Assistance: Minimal assistance;Assist x2        Gait Abnormalities: Antalgic     Left Side Weight Bearing:  Toe touch  Base of Support: Widened  Stance: Left decreased  Speed/Catherine: Pace decreased (<100 feet/min)            education for techniques while TTWB to LLE. Demonstrates good compliance with weight bearing precaution and good understanding of sequencing using RW. Therapeutic Exercises: Ankle pumps x 10 BLEs; Quad sets x 10 BLEs with limited muscle recruitment to L quad. Pain:  Pain Scale 1: Numeric (0 - 10)  Pain Intensity 1: 2  Pain Location 1: Knee  Pain Orientation 1: Left  Pain Description 1: Aching  Pain Intervention(s) 1: Medication (see MAR); rest; cold pack replaced  Activity Tolerance:   No pt complaints; limited by fatigue and fluctuating BP  Please refer to the flowsheet for vital signs taken during this treatment.   After treatment:   [x]    Patient left in no apparent distress sitting up in chair; RN aware to monitor pt symptoms and BP.  []    Patient left in no apparent distress in bed  [x]    Call bell left within reach  [x]    Nursing notified  [x]    Caregiver present  []    Bed alarm activated    COMMUNICATION/COLLABORATION:   The patients plan of care was discussed with: Registered Nurse and Rehabilitation Attendant    Hang Mello PT, DPT   Time Calculation: 27 mins

## 2018-09-07 LAB
25(OH)D3 SERPL-MCNC: 20.4 NG/ML (ref 30–100)
ANION GAP SERPL CALC-SCNC: 8 MMOL/L (ref 5–15)
APPEARANCE UR: CLEAR
BACTERIA URNS QL MICRO: ABNORMAL /HPF
BILIRUB UR QL: NEGATIVE
BUN SERPL-MCNC: 8 MG/DL (ref 6–20)
BUN/CREAT SERPL: 25 (ref 12–20)
CALCIUM SERPL-MCNC: 8.3 MG/DL (ref 8.5–10.1)
CHLORIDE SERPL-SCNC: 105 MMOL/L (ref 97–108)
CO2 SERPL-SCNC: 26 MMOL/L (ref 21–32)
COLOR UR: ABNORMAL
CREAT SERPL-MCNC: 0.32 MG/DL (ref 0.55–1.02)
EPITH CASTS URNS QL MICRO: ABNORMAL /LPF
ERYTHROCYTE [DISTWIDTH] IN BLOOD BY AUTOMATED COUNT: 13.4 % (ref 11.5–14.5)
GLUCOSE SERPL-MCNC: 99 MG/DL (ref 65–100)
GLUCOSE UR STRIP.AUTO-MCNC: 100 MG/DL
HCT VFR BLD AUTO: 24.6 % (ref 35–47)
HGB BLD-MCNC: 8 G/DL (ref 11.5–16)
HGB UR QL STRIP: ABNORMAL
HYALINE CASTS URNS QL MICRO: ABNORMAL /LPF (ref 0–5)
KETONES UR QL STRIP.AUTO: NEGATIVE MG/DL
LEUKOCYTE ESTERASE UR QL STRIP.AUTO: NEGATIVE
MCH RBC QN AUTO: 30.1 PG (ref 26–34)
MCHC RBC AUTO-ENTMCNC: 32.5 G/DL (ref 30–36.5)
MCV RBC AUTO: 92.5 FL (ref 80–99)
NITRITE UR QL STRIP.AUTO: NEGATIVE
NRBC # BLD: 0 K/UL (ref 0–0.01)
NRBC BLD-RTO: 0 PER 100 WBC
PH UR STRIP: 7 [PH] (ref 5–8)
PLATELET # BLD AUTO: 155 K/UL (ref 150–400)
PMV BLD AUTO: 10.7 FL (ref 8.9–12.9)
POTASSIUM SERPL-SCNC: 3.3 MMOL/L (ref 3.5–5.1)
PROT UR STRIP-MCNC: NEGATIVE MG/DL
RBC # BLD AUTO: 2.66 M/UL (ref 3.8–5.2)
RBC #/AREA URNS HPF: ABNORMAL /HPF (ref 0–5)
SODIUM SERPL-SCNC: 139 MMOL/L (ref 136–145)
SP GR UR REFRACTOMETRY: 1.01 (ref 1–1.03)
UROBILINOGEN UR QL STRIP.AUTO: 0.2 EU/DL (ref 0.2–1)
WBC # BLD AUTO: 5.4 K/UL (ref 3.6–11)
WBC URNS QL MICRO: ABNORMAL /HPF (ref 0–4)

## 2018-09-07 PROCEDURE — 36415 COLL VENOUS BLD VENIPUNCTURE: CPT | Performed by: PHYSICAL MEDICINE & REHABILITATION

## 2018-09-07 PROCEDURE — 85027 COMPLETE CBC AUTOMATED: CPT | Performed by: PHYSICAL MEDICINE & REHABILITATION

## 2018-09-07 PROCEDURE — 87186 SC STD MICRODIL/AGAR DIL: CPT | Performed by: PHYSICAL MEDICINE & REHABILITATION

## 2018-09-07 PROCEDURE — 80048 BASIC METABOLIC PNL TOTAL CA: CPT | Performed by: PHYSICAL MEDICINE & REHABILITATION

## 2018-09-07 PROCEDURE — 82306 VITAMIN D 25 HYDROXY: CPT | Performed by: PHYSICAL MEDICINE & REHABILITATION

## 2018-09-07 PROCEDURE — 81001 URINALYSIS AUTO W/SCOPE: CPT | Performed by: PHYSICAL MEDICINE & REHABILITATION

## 2018-09-07 PROCEDURE — 87086 URINE CULTURE/COLONY COUNT: CPT | Performed by: PHYSICAL MEDICINE & REHABILITATION

## 2018-09-07 PROCEDURE — 87077 CULTURE AEROBIC IDENTIFY: CPT | Performed by: PHYSICAL MEDICINE & REHABILITATION

## 2018-09-09 LAB
BACTERIA SPEC CULT: ABNORMAL
BACTERIA SPEC CULT: ABNORMAL
CC UR VC: ABNORMAL
SERVICE CMNT-IMP: ABNORMAL

## 2018-09-09 NOTE — DISCHARGE SUMMARY
Ortho Discharge Summary      Patient ID:  Deandre Reyes  [id number]  70 y.o.  1950    Allergies: Review of patient's allergies indicates no known allergies. Admit date: 9/3/2018    Discharge date and time: 9/6/2018  3:55 PM     Admitting Physician: Jamie Powell DO     Discharge Physician: Naa Fuentes    * Admission Diagnoses: Fracture, femur closed, shaft ([address]  LEFT INTERTROCHANTERIC FRACTURE    * Discharge Diagnoses:   Hospital Problems as of 9/6/2018  Date Reviewed: 9/4/2018          Codes Class Noted - Resolved POA    Fracture, femur closed, shaft ([address] ICD-10-CM: G10.811E  ICD-9-CM: 821.01  9/3/2018 - Present Yes        Hyperlipidemia (Chronic) ICD-10-CM: E78.5  ICD-9-CM: 272.4  9/3/2018 - Present Yes        Osteoporosis (Chronic) ICD-10-CM: M81.0  ICD-9-CM: 733.00  9/3/2018 - Present Yes        Asthma (Chronic) ICD-10-CM: J45.909  ICD-9-CM: 493.90  9/3/2018 - Present Yes              Surgeon: Naa Fuentes    Preoperative Medical Clearance: obtained    * Procedure: Procedure(s) with comments:  FEMUR INTRA MEDULLARY NAILING RETROGRADE Left - SYNTHES, TFN, C-ARM           Perioperative Antibiotics: Ancef      Postoperative Pain Management:  oxy    DVT Prophylaxis:                                    Lovenox                                  EDWARD Hose                                  Plexi-Pulse    Postoperative transfusions:     none Banked PRBCs     Post Op complications: none        * Discharge Condition: improved  Wound appears to be healing without any evidence of infection. Physical Therapy started on the day following surgery and we kept her pwb.       * Discharged to: rehab    * Follow-up Care/Discharge instructions:  - Anticoagulate with: asa bid  - Resume pre hospital diet            - Resume home medications per medical continuation form     - Follow up in office as scheduled       Signed:  Jamie Powell DO  9/8/2018  10:55 PM

## 2018-09-13 LAB
ANION GAP SERPL CALC-SCNC: 5 MMOL/L (ref 5–15)
BASOPHILS # BLD: 0 K/UL (ref 0–0.1)
BASOPHILS NFR BLD: 1 % (ref 0–1)
BUN SERPL-MCNC: 9 MG/DL (ref 6–20)
BUN/CREAT SERPL: 21 (ref 12–20)
CALCIUM SERPL-MCNC: 8.7 MG/DL (ref 8.5–10.1)
CHLORIDE SERPL-SCNC: 106 MMOL/L (ref 97–108)
CO2 SERPL-SCNC: 28 MMOL/L (ref 21–32)
CREAT SERPL-MCNC: 0.43 MG/DL (ref 0.55–1.02)
DIFFERENTIAL METHOD BLD: ABNORMAL
EOSINOPHIL # BLD: 0.1 K/UL (ref 0–0.4)
EOSINOPHIL NFR BLD: 2 % (ref 0–7)
ERYTHROCYTE [DISTWIDTH] IN BLOOD BY AUTOMATED COUNT: 14.3 % (ref 11.5–14.5)
GLUCOSE SERPL-MCNC: 92 MG/DL (ref 65–100)
HCT VFR BLD AUTO: 26.2 % (ref 35–47)
HGB BLD-MCNC: 8.4 G/DL (ref 11.5–16)
IMM GRANULOCYTES # BLD: 0 K/UL (ref 0–0.04)
IMM GRANULOCYTES NFR BLD AUTO: 1 % (ref 0–0.5)
LYMPHOCYTES # BLD: 1 K/UL (ref 0.8–3.5)
LYMPHOCYTES NFR BLD: 22 % (ref 12–49)
MCH RBC QN AUTO: 30.5 PG (ref 26–34)
MCHC RBC AUTO-ENTMCNC: 32.1 G/DL (ref 30–36.5)
MCV RBC AUTO: 95.3 FL (ref 80–99)
MONOCYTES # BLD: 0.5 K/UL (ref 0–1)
MONOCYTES NFR BLD: 11 % (ref 5–13)
NEUTS SEG # BLD: 2.8 K/UL (ref 1.8–8)
NEUTS SEG NFR BLD: 64 % (ref 32–75)
NRBC # BLD: 0 K/UL (ref 0–0.01)
NRBC BLD-RTO: 0 PER 100 WBC
PLATELET # BLD AUTO: 353 K/UL (ref 150–400)
PMV BLD AUTO: 9.4 FL (ref 8.9–12.9)
POTASSIUM SERPL-SCNC: 3.7 MMOL/L (ref 3.5–5.1)
RBC # BLD AUTO: 2.75 M/UL (ref 3.8–5.2)
SODIUM SERPL-SCNC: 139 MMOL/L (ref 136–145)
WBC # BLD AUTO: 4.4 K/UL (ref 3.6–11)

## 2018-09-13 PROCEDURE — 80048 BASIC METABOLIC PNL TOTAL CA: CPT | Performed by: PHYSICAL MEDICINE & REHABILITATION

## 2018-09-13 PROCEDURE — 85025 COMPLETE CBC W/AUTO DIFF WBC: CPT | Performed by: PHYSICAL MEDICINE & REHABILITATION

## 2018-09-13 PROCEDURE — 36415 COLL VENOUS BLD VENIPUNCTURE: CPT | Performed by: PHYSICAL MEDICINE & REHABILITATION

## 2021-10-18 ENCOUNTER — HOSPITAL ENCOUNTER (OUTPATIENT)
Dept: PREADMISSION TESTING | Age: 71
Discharge: HOME | End: 2021-10-18
Payer: MEDICARE

## 2021-10-18 PROCEDURE — U0005 INFEC AGEN DETEC AMPLI PROBE: HCPCS

## 2021-10-20 LAB
SARS-COV-2, XPLCVT: NOT DETECTED
SOURCE, COVRS: NORMAL

## 2021-10-21 RX ORDER — MENTHOL
1000 GEL (GRAM) TOPICAL DAILY
COMMUNITY

## 2021-10-21 RX ORDER — LEVOCETIRIZINE DIHYDROCHLORIDE 5 MG/1
5 TABLET, FILM COATED ORAL DAILY
COMMUNITY

## 2021-10-21 RX ORDER — POLYETHYLENE GLYCOL 3350 17 G/17G
17 POWDER, FOR SOLUTION ORAL
COMMUNITY

## 2021-10-21 RX ORDER — DENOSUMAB 60 MG/ML
60 INJECTION SUBCUTANEOUS
COMMUNITY

## 2021-10-22 ENCOUNTER — HOSPITAL ENCOUNTER (OUTPATIENT)
Age: 71
Setting detail: OUTPATIENT SURGERY
Discharge: HOME | End: 2021-10-22
Attending: SPECIALIST | Admitting: SPECIALIST
Payer: MEDICARE

## 2021-10-22 ENCOUNTER — ANESTHESIA EVENT (OUTPATIENT)
Dept: ENDOSCOPY | Age: 71
End: 2021-10-22
Payer: MEDICARE

## 2021-10-22 ENCOUNTER — ANESTHESIA (OUTPATIENT)
Dept: ENDOSCOPY | Age: 71
End: 2021-10-22
Payer: MEDICARE

## 2021-10-22 VITALS
TEMPERATURE: 97.5 F | DIASTOLIC BLOOD PRESSURE: 88 MMHG | HEART RATE: 64 BPM | WEIGHT: 193 LBS | SYSTOLIC BLOOD PRESSURE: 157 MMHG | OXYGEN SATURATION: 99 % | BODY MASS INDEX: 31.02 KG/M2 | HEIGHT: 66 IN | RESPIRATION RATE: 14 BRPM

## 2021-10-22 PROCEDURE — 74011250636 HC RX REV CODE- 250/636: Performed by: NURSE ANESTHETIST, CERTIFIED REGISTERED

## 2021-10-22 PROCEDURE — 76040000019: Performed by: SPECIALIST

## 2021-10-22 PROCEDURE — 77030010936 HC CLP LIG BSC -C: Performed by: SPECIALIST

## 2021-10-22 PROCEDURE — 74011250636 HC RX REV CODE- 250/636: Performed by: SPECIALIST

## 2021-10-22 PROCEDURE — 74011000250 HC RX REV CODE- 250: Performed by: NURSE ANESTHETIST, CERTIFIED REGISTERED

## 2021-10-22 PROCEDURE — 88305 TISSUE EXAM BY PATHOLOGIST: CPT

## 2021-10-22 PROCEDURE — 76060000031 HC ANESTHESIA FIRST 0.5 HR: Performed by: SPECIALIST

## 2021-10-22 PROCEDURE — 2709999900 HC NON-CHARGEABLE SUPPLY: Performed by: SPECIALIST

## 2021-10-22 PROCEDURE — 77030013992 HC SNR POLYP ENDOSC BSC -B: Performed by: SPECIALIST

## 2021-10-22 DEVICE — CLIP INT L235CM WRK CHAN DIA2.8MM OPN 11MM LCK MECHANISM MR: Type: IMPLANTABLE DEVICE | Site: ASCENDING COLON | Status: FUNCTIONAL

## 2021-10-22 RX ORDER — DEXTROMETHORPHAN/PSEUDOEPHED 2.5-7.5/.8
1.2 DROPS ORAL
Status: DISCONTINUED | OUTPATIENT
Start: 2021-10-22 | End: 2021-10-22 | Stop reason: HOSPADM

## 2021-10-22 RX ORDER — PROPOFOL 10 MG/ML
INJECTION, EMULSION INTRAVENOUS AS NEEDED
Status: DISCONTINUED | OUTPATIENT
Start: 2021-10-22 | End: 2021-10-22 | Stop reason: HOSPADM

## 2021-10-22 RX ORDER — LIDOCAINE HYDROCHLORIDE 20 MG/ML
INJECTION, SOLUTION EPIDURAL; INFILTRATION; INTRACAUDAL; PERINEURAL AS NEEDED
Status: DISCONTINUED | OUTPATIENT
Start: 2021-10-22 | End: 2021-10-22 | Stop reason: HOSPADM

## 2021-10-22 RX ORDER — SODIUM CHLORIDE 0.9 % (FLUSH) 0.9 %
5-40 SYRINGE (ML) INJECTION AS NEEDED
Status: DISCONTINUED | OUTPATIENT
Start: 2021-10-22 | End: 2021-10-22 | Stop reason: HOSPADM

## 2021-10-22 RX ORDER — SODIUM CHLORIDE 9 MG/ML
50 INJECTION, SOLUTION INTRAVENOUS CONTINUOUS
Status: DISCONTINUED | OUTPATIENT
Start: 2021-10-22 | End: 2021-10-22 | Stop reason: HOSPADM

## 2021-10-22 RX ORDER — SODIUM CHLORIDE 0.9 % (FLUSH) 0.9 %
5-40 SYRINGE (ML) INJECTION EVERY 8 HOURS
Status: DISCONTINUED | OUTPATIENT
Start: 2021-10-22 | End: 2021-10-22 | Stop reason: HOSPADM

## 2021-10-22 RX ADMIN — PROPOFOL 10 MG: 10 INJECTION, EMULSION INTRAVENOUS at 10:45

## 2021-10-22 RX ADMIN — PROPOFOL 40 MG: 10 INJECTION, EMULSION INTRAVENOUS at 10:40

## 2021-10-22 RX ADMIN — LIDOCAINE HYDROCHLORIDE 50 MG: 20 INJECTION, SOLUTION EPIDURAL; INFILTRATION; INTRACAUDAL; PERINEURAL at 10:32

## 2021-10-22 RX ADMIN — SODIUM CHLORIDE 50 ML/HR: 9 INJECTION, SOLUTION INTRAVENOUS at 09:37

## 2021-10-22 RX ADMIN — PROPOFOL 60 MG: 10 INJECTION, EMULSION INTRAVENOUS at 10:32

## 2021-10-22 RX ADMIN — PROPOFOL 60 MG: 10 INJECTION, EMULSION INTRAVENOUS at 10:25

## 2021-10-22 RX ADMIN — PROPOFOL 10 MG: 10 INJECTION, EMULSION INTRAVENOUS at 10:34

## 2021-10-22 RX ADMIN — PROPOFOL 50 MG: 10 INJECTION, EMULSION INTRAVENOUS at 10:43

## 2021-10-22 RX ADMIN — PROPOFOL 10 MG: 10 INJECTION, EMULSION INTRAVENOUS at 10:37

## 2021-10-22 RX ADMIN — PROPOFOL 10 MG: 10 INJECTION, EMULSION INTRAVENOUS at 10:35

## 2021-10-22 RX ADMIN — PROPOFOL 10 MG: 10 INJECTION, EMULSION INTRAVENOUS at 10:36

## 2021-10-22 NOTE — H&P
Gastroenterology Outpatient History and Physical    Patient: Casimiro Cota    Physician: Ivan Tracey MD    Vital Signs: Blood pressure (!) 142/64, pulse 91, temperature 96.8 °F (36 °C), resp. rate 18, height 5' 5.5\" (1.664 m), weight 87.5 kg (193 lb), SpO2 98 %, not currently breastfeeding. Allergies: No Known Allergies    Chief Complaint: FH colon CA    History of Present Illness: 67 yo WF for Colonoscopy for FH colon ca. Justification for Procedure: above    History:  Past Medical History:   Diagnosis Date    Asthma     Cancer ([address]     skin    Environmental and seasonal allergies     Osteoporosis       Past Surgical History:   Procedure Laterality Date    COLONOSCOPY N/A 7/26/2016    COLONOSCOPY [facility]    HX ORTHOPAEDIC Left 2018    fx femur, titanium bryon    HX OTHER SURGICAL      exc skin ca      Social History     Socioeconomic History    Marital status: SINGLE     Spouse name: Not on file    Number of children: Not on file    Years of education: Not on file    Highest education level: Not on file   Tobacco Use    Smoking status: Never Smoker    Smokeless tobacco: Never Used   Substance and Sexual Activity    Alcohol use: No    Drug use: No     Social Determinants of Health     Financial Resource Strain:     Difficulty of Paying Living Expenses:    Food Insecurity:     Worried About Running Out of Food in the Last Year:     Ran Out of Food in the Last Year:    Transportation Needs:     Lack of Transportation (Medical):      Lack of Transportation (Non-Medical):    Physical Activity:     Days of Exercise per Week:     Minutes of Exercise per Session:    Stress:     Feeling of Stress :    Social Connections:     Frequency of Communication with Friends and Family:     Frequency of Social Gatherings with Friends and Family:     Attends Mosque Services:     Active Member of Clubs or Organizations:     Attends Club or Organization Meetings:     Marital Status:     History reviewed. No pertinent family history. Medications:   Prior to Admission medications    Medication Sig Start Date End Date Taking? Authorizing Provider   levocetirizine (Xyzal) 5 mg tablet Take 5 mg by mouth daily. Yes Provider, Historical   denosumab (Prolia) 60 mg/mL injection 60 mg by SubCUTAneous route every 6 months. Yes Provider, Historical   vitamin e (E GEMS) 1,000 unit capsule Take 1,000 Units by mouth daily. Yes Provider, Historical   polyethylene glycol (Miralax) 17 gram packet Take 17 g by mouth nightly. Yes Provider, Historical   fluticasone-vilanterol (BREO ELLIPTA) 100-25 mcg/dose inhaler Take 1 Puff by inhalation daily. Yes Other, MD Karan   vitamin E, dl,tocopheryl acet, (VITAMIN E ACETATE) 400 unit cap capsule Take 400 Units by mouth daily. Yes Other, MD Karan   montelukast (SINGULAIR) 10 mg tablet Take 10 mg by mouth daily. Yes Provider, Historical   simvastatin (ZOCOR) 20 mg tablet Take  by mouth nightly. Yes Provider, Historical   mometasone (NASONEX) 50 mcg/actuation nasal spray 2 Sprays by Both Nostrils route daily. Yes Provider, Historical   cholecalciferol (VITAMIN D3) 1,000 unit tablet Take  by mouth daily. Yes Provider, Historical   calcium-cholecalciferol, d3, 600-125 mg-unit tab Take 1 Tablet by mouth daily. Yes Provider, Historical   multivitamin (ONE A DAY) tablet Take 1 Tab by mouth daily. Provider, Historical       Physical Exam:   General: alert, no distress   HEENT: Head: Normocephalic, no lesions, without obvious abnormality.    Heart: regular rate and rhythm, S1, S2 normal, no murmur, click, rub or gallop   Lungs: chest clear, no wheezing, rales, normal symmetric air entry   Abdominal: soft, NT/ND + BS   Neurological: Grossly normal   Extremities: extremities normal, atraumatic, no cyanosis or edema     Findings/Diagnosis: FH colon ca    Plan of Care/Planned Procedure: Colonoscopy

## 2021-10-22 NOTE — DISCHARGE INSTRUCTIONS
Harjeet Handler  [id number]  1950    COLON DISCHARGE INSTRUCTIONS  Discomfort:  Redness at IV site- apply warm compress to area; if redness or soreness persist- contact your physician  There may be a slight amount of blood passed from the rectum  Gaseous discomfort- walking, belching will help relieve any discomfort  You may not operate a vehicle for 12 hours  You may not engage in an occupation involving machinery or appliances for rest of today  You may not drink alcoholic beverages for at least 12 hours  Avoid making any critical decisions for at least 24 hour  DIET:   Regular diet. - however -  remember your colon is empty and a heavy meal will produce gas. Avoid these foods:  vegetables, fried / greasy foods, carbonated drinks for today. MEDICATIONS:        Regarding Aspirin or Nonsteroidal medications, please see below. ACTIVITY:  You may resume your normal daily activities it is recommended that you spend the remainder of the day resting -  avoid any strenuous activity. CALL M.D. ANY SIGN OF:  Increasing pain, nausea, vomiting  Abdominal distension (swelling)  New increased bleeding (oral or rectal)  Fever (chills)  Pain in chest area  Bloody discharge from nose or mouth  Shortness of breath    You may not  take any Advil, Aspirin, Ibuprofen, Motrin, Aleve, or Goodys for 10 days, ONLY  Tylenol as needed for pain.       Follow-up Instructions:   Call Dr. Franky Rajput for results of procedure / biopsy in 4-5 days at telephone #  [phone]              Repeat Colonoscopy in 3 years

## 2021-10-22 NOTE — PROCEDURES
Colonoscopy Procedure Note    Indications:   Family history of coloretal cancer (screening only)    Referring Physician: Herber Wilson MD  Anesthesia/Sedation: MAC anesthesia Propofol  Endoscopist:  Dr. Tommy Das    Procedure in Detail:  Informed consent was obtained for the procedure, including sedation. Risks of perforation, hemorrhage, adverse drug reaction, and aspiration were discussed. The patient was placed in the left lateral decubitus position. Based on the pre-procedure assessment, including review of the patient's medical history, medications, allergies, and review of systems, she had been deemed to be an appropriate candidate for moderate sedation; she was therefore sedated with the medications listed above. The patient was monitored continuously with ECG tracing, pulse oximetry, blood pressure monitoring, and direct observations. A rectal examination was performed. The [id number] was inserted into the rectum and advanced under direct vision to the cecum, which was identified by the ileocecal valve and appendiceal orifice. The quality of the colonic preparation was adequate. A careful inspection was made as the colonoscope was withdrawn, including a retroflexed view of the rectum; findings and interventions are described below. Appropriate photodocumentation was obtained. Findings:   1. Scope advanced to the cecum. 2.  Preparation was adequate. 3.  There was a sessile polyp 1.2 cm in the ascending colon near the hepatic flexure between folds that appeared semi peduncualted. We removed with hot snare polypectomy in one piece and placed on hemoclip at the site. 4.  No other polyps seen. 5.  Diffuse moderate sigmoid diverticulosis. Therapies:  See above    Specimen: Specimens were collected as described above and sent to pathology. Complications: None were encountered during the procedure. EBL: < 10 ml.     Recommendations:     - Repeat colonoscopy in 3 years.    Signed By: Lu Elizalde MD                        October 22, 2021

## 2021-10-22 NOTE — ANESTHESIA POSTPROCEDURE EVALUATION
Procedure(s):  COLONOSCOPY  ENDOSCOPIC POLYPECTOMY  RESOLUTION CLIP.    total IV anesthesia    Anesthesia Post Evaluation        Patient location during evaluation: PACU  Note status: Adequate. Level of consciousness: responsive to verbal stimuli and sleepy but conscious  Pain management: satisfactory to patient  Airway patency: patent  Anesthetic complications: no  Cardiovascular status: acceptable  Respiratory status: acceptable  Hydration status: acceptable  Comments: +Post-Anesthesia Evaluation and Assessment    Patient: Nemo Krause MRN: [id number]  SSN: [id number]   YOB: 1950  Age: 66 y.o. Sex: female      Cardiovascular Function/Vital Signs    BP (!) 124/41   Pulse 69   Temp 36 °C (96.8 °F)   Resp 20   Ht 5' 5.5\" (1.664 m)   Wt 87.5 kg (193 lb)   SpO2 100%   Breastfeeding No   BMI 31.63 kg/m²     Patient is status post Procedure(s):  COLONOSCOPY  ENDOSCOPIC POLYPECTOMY  RESOLUTION CLIP. Nausea/Vomiting: Controlled. Postoperative hydration reviewed and adequate. Pain:  Pain Scale 1: Numeric (0 - 10) (10/22/21 6852)  Pain Intensity 1: 0 (10/22/21 1684)   Managed. Neurological Status: At baseline. Mental Status and Level of Consciousness: Arousable. Pulmonary Status:   O2 Device: None (10/22/21 1050)   Adequate oxygenation and airway patent. Complications related to anesthesia: None    Post-anesthesia assessment completed. No concerns. Signed By: Fidel Nicolas MD    10/22/2021  Post anesthesia nausea and vomiting:  controlled      INITIAL Post-op Vital signs: No vitals data found for the desired time range. Statement Selected

## 2021-10-29 ENCOUNTER — HOSPITAL ENCOUNTER (OUTPATIENT)
Age: 71
Setting detail: OUTPATIENT SURGERY
Discharge: HOME | End: 2021-10-29
Attending: SPECIALIST | Admitting: SPECIALIST
Payer: MEDICARE

## 2021-10-29 ENCOUNTER — ANESTHESIA EVENT (OUTPATIENT)
Dept: ENDOSCOPY | Age: 71
End: 2021-10-29
Payer: MEDICARE

## 2021-10-29 ENCOUNTER — ANESTHESIA (OUTPATIENT)
Dept: ENDOSCOPY | Age: 71
End: 2021-10-29
Payer: MEDICARE

## 2021-10-29 VITALS
BODY MASS INDEX: 31.5 KG/M2 | OXYGEN SATURATION: 99 % | TEMPERATURE: 97.7 F | RESPIRATION RATE: 17 BRPM | DIASTOLIC BLOOD PRESSURE: 75 MMHG | WEIGHT: 189.1 LBS | HEIGHT: 65 IN | HEART RATE: 74 BPM | SYSTOLIC BLOOD PRESSURE: 176 MMHG

## 2021-10-29 LAB
COVID-19 RAPID TEST, COVR: NOT DETECTED
SARS-COV-2, COV2: NORMAL
SOURCE, COVRS: NORMAL

## 2021-10-29 PROCEDURE — 74011250636 HC RX REV CODE- 250/636: Performed by: ANESTHESIOLOGY

## 2021-10-29 PROCEDURE — 74011250636 HC RX REV CODE- 250/636: Performed by: SPECIALIST

## 2021-10-29 PROCEDURE — 2709999900 HC NON-CHARGEABLE SUPPLY: Performed by: SPECIALIST

## 2021-10-29 PROCEDURE — 77030038665 HC SOL ELEVIEW INJ AGNT ARPH -B: Performed by: SPECIALIST

## 2021-10-29 PROCEDURE — 76060000031 HC ANESTHESIA FIRST 0.5 HR: Performed by: SPECIALIST

## 2021-10-29 PROCEDURE — 87635 SARS-COV-2 COVID-19 AMP PRB: CPT

## 2021-10-29 PROCEDURE — 76040000019: Performed by: SPECIALIST

## 2021-10-29 PROCEDURE — 77030003657 HC NDL SCLER BSC -B: Performed by: SPECIALIST

## 2021-10-29 PROCEDURE — 74011000250 HC RX REV CODE- 250: Performed by: ANESTHESIOLOGY

## 2021-10-29 RX ORDER — SODIUM CHLORIDE 9 MG/ML
50 INJECTION, SOLUTION INTRAVENOUS CONTINUOUS
Status: DISCONTINUED | OUTPATIENT
Start: 2021-10-29 | End: 2021-10-29 | Stop reason: HOSPADM

## 2021-10-29 RX ORDER — DEXTROMETHORPHAN/PSEUDOEPHED 2.5-7.5/.8
1.2 DROPS ORAL
Status: DISCONTINUED | OUTPATIENT
Start: 2021-10-29 | End: 2021-10-29 | Stop reason: HOSPADM

## 2021-10-29 RX ORDER — SODIUM CHLORIDE 0.9 % (FLUSH) 0.9 %
5-40 SYRINGE (ML) INJECTION EVERY 8 HOURS
Status: DISCONTINUED | OUTPATIENT
Start: 2021-10-29 | End: 2021-10-29 | Stop reason: HOSPADM

## 2021-10-29 RX ORDER — ONDANSETRON 2 MG/ML
4 INJECTION INTRAMUSCULAR; INTRAVENOUS ONCE
Status: DISCONTINUED | OUTPATIENT
Start: 2021-10-29 | End: 2021-10-29 | Stop reason: HOSPADM

## 2021-10-29 RX ORDER — PROPOFOL 10 MG/ML
INJECTION, EMULSION INTRAVENOUS AS NEEDED
Status: DISCONTINUED | OUTPATIENT
Start: 2021-10-29 | End: 2021-10-29 | Stop reason: HOSPADM

## 2021-10-29 RX ORDER — SODIUM CHLORIDE 0.9 % (FLUSH) 0.9 %
5-40 SYRINGE (ML) INJECTION AS NEEDED
Status: DISCONTINUED | OUTPATIENT
Start: 2021-10-29 | End: 2021-10-29 | Stop reason: HOSPADM

## 2021-10-29 RX ORDER — LIDOCAINE HYDROCHLORIDE 20 MG/ML
INJECTION, SOLUTION EPIDURAL; INFILTRATION; INTRACAUDAL; PERINEURAL AS NEEDED
Status: DISCONTINUED | OUTPATIENT
Start: 2021-10-29 | End: 2021-10-29 | Stop reason: HOSPADM

## 2021-10-29 RX ORDER — ONDANSETRON 2 MG/ML
INJECTION INTRAMUSCULAR; INTRAVENOUS AS NEEDED
Status: DISCONTINUED | OUTPATIENT
Start: 2021-10-29 | End: 2021-10-29 | Stop reason: HOSPADM

## 2021-10-29 RX ORDER — ONDANSETRON 2 MG/ML
INJECTION INTRAMUSCULAR; INTRAVENOUS
Status: COMPLETED
Start: 2021-10-29 | End: 2021-10-29

## 2021-10-29 RX ADMIN — ONDANSETRON HYDROCHLORIDE 4 MG: 2 INJECTION, SOLUTION INTRAMUSCULAR; INTRAVENOUS at 14:30

## 2021-10-29 RX ADMIN — SODIUM CHLORIDE 50 ML/HR: 900 INJECTION, SOLUTION INTRAVENOUS at 14:06

## 2021-10-29 RX ADMIN — PROPOFOL 180 MG: 10 INJECTION, EMULSION INTRAVENOUS at 14:30

## 2021-10-29 RX ADMIN — LIDOCAINE HYDROCHLORIDE 40 MG: 20 INJECTION, SOLUTION EPIDURAL; INFILTRATION; INTRACAUDAL; PERINEURAL at 14:09

## 2021-10-29 NOTE — PROGRESS NOTES
1421:  Endoscope was pre-cleaned at the bedside immediately following procedure by [place] tech. 1432:  Anesthesia reports 180 mg Propofol, 40 mg Lidocaine and 400 mL NS given during procedure. 4 mg Zofran given immediately post procedure. Received report from anesthesia staff on vital signs and status of patient.

## 2021-10-29 NOTE — H&P
Gastroenterology Outpatient History and Physical    Patient: Conchita Navarro    Physician: Scott Forman MD    Vital Signs: Blood pressure (!) 166/52, pulse 74, temperature 97.9 °F (36.6 °C), resp. rate 17, height 5' 5\" (1.651 m), weight 85.8 kg (189 lb 1.6 oz), SpO2 97 %, not currently breastfeeding. Allergies: No Known Allergies    Chief Complaint: H/O Colon polyp with low grade dysplasia on histology for repeat colonoscopy with evin ink tattooing. History of Present Illness: above    Justification for Procedure: above    History:  Past Medical History:   Diagnosis Date    Asthma     Cancer ([address]     skin    Environmental and seasonal allergies     Osteoporosis       Past Surgical History:   Procedure Laterality Date    COLONOSCOPY N/A 7/26/2016    COLONOSCOPY performed [facility] Scott Forman MD at [facility]    COLONOSCOPY N/A 10/22/2021    COLONOSCOPY performed by Jesse Cardoso MD at [facility] ENDOSCOPY    HX ORTHOPAEDIC Left 2018    fx femur, titanium bryon    HX OTHER SURGICAL      exc skin ca      Social History     Socioeconomic History    Marital status: SINGLE     Spouse name: Not on file    Number of children: Not on file    Years of education: Not on file    Highest education level: Not on file   Tobacco Use    Smoking status: Never Smoker    Smokeless tobacco: Never Used   Vaping Use    Vaping Use: Never used   Substance and Sexual Activity    Alcohol use: No    Drug use: No     Social Determinants of Health     Financial Resource Strain:     Difficulty of Paying Living Expenses:    Food Insecurity:     Worried About Running Out of Food in the Last Year:     Ran Out of Food in the Last Year:    Transportation Needs:     Lack of Transportation (Medical):      Lack of Transportation (Non-Medical):    Physical Activity:     Days of Exercise per Week:     Minutes of Exercise per Session:    Stress:     Feeling of Stress :    Social Connections:     Frequency of Communication with Friends and Family:     Frequency of Social Gatherings with Friends and Family:     Attends Mu-ism Services:     Active Member of Clubs or Organizations:     Attends Club or Organization Meetings:     Marital Status:       Family History   Problem Relation Age of Onset    Cancer Mother         colon     Heart Disease Father        Medications:   Prior to Admission medications    Medication Sig Start Date End Date Taking? Authorizing Provider   levocetirizine (Xyzal) 5 mg tablet Take 5 mg by mouth daily. Yes Provider, Historical   denosumab (Prolia) 60 mg/mL injection 60 mg by SubCUTAneous route every 6 months. Yes Provider, Historical   vitamin e (E GEMS) 1,000 unit capsule Take 1,000 Units by mouth daily. Yes Provider, Historical   polyethylene glycol (Miralax) 17 gram packet Take 17 g by mouth nightly. Yes Provider, Historical   fluticasone-vilanterol (BREO ELLIPTA) 100-25 mcg/dose inhaler Take 1 Puff by inhalation daily. Yes Other, MD Karan   vitamin E, dl,tocopheryl acet, (VITAMIN E ACETATE) 400 unit cap capsule Take 400 Units by mouth daily. Yes Other, MD Karan   montelukast (SINGULAIR) 10 mg tablet Take 10 mg by mouth daily. Yes Provider, Historical   simvastatin (ZOCOR) 20 mg tablet Take  by mouth nightly. Yes Provider, Historical   mometasone (NASONEX) 50 mcg/actuation nasal spray 2 Sprays by Both Nostrils route daily. Yes Provider, Historical   cholecalciferol (VITAMIN D3) 1,000 unit tablet Take  by mouth daily. Yes Provider, Historical   multivitamin (ONE A DAY) tablet Take 1 Tab by mouth daily. Yes Provider, Historical   calcium-cholecalciferol, d3, 600-125 mg-unit tab Take 1 Tablet by mouth daily. Yes Provider, Historical       Physical Exam:   General: alert, no distress   HEENT: Head: Normocephalic, no lesions, without obvious abnormality.    Heart: regular rate and rhythm, S1, S2 normal, no murmur, click, rub or gallop   Lungs: chest clear, no wheezing, rales, normal symmetric air entry   Abdominal: soft, NT/ND+ BS   Neurological: Grossly normal   Extremities: extremities normal, atraumatic, no cyanosis or edema     Findings/Diagnosis: H/O Colon polyp with low grade dysplasia    Plan of Care/Planned Procedure: Colonoscopy

## 2021-10-29 NOTE — ANESTHESIA PREPROCEDURE EVALUATION
Anesthetic History   No history of anesthetic complications            Review of Systems / Medical History  Patient summary reviewed, nursing notes reviewed and pertinent labs reviewed    Pulmonary            Asthma : well controlled       Neuro/Psych   Within defined limits           Cardiovascular              Hyperlipidemia    Exercise tolerance: >4 METS     GI/Hepatic/Renal  Within defined limits              Endo/Other        Obesity, cancer (skin cancer) and anemia     Other Findings   Comments: Osteoporosis  Hx skin ca           Physical Exam    Airway  Mallampati: III  TM Distance: 4 - 6 cm  Neck ROM: normal range of motion   Mouth opening: Diminished (comment)     Cardiovascular    Rhythm: regular  Rate: normal    Murmur: Grade 1     Dental    Dentition: Lower dentition intact, Upper dentition intact and Caps/crowns  Comments: Overbite   Pulmonary  Breath sounds clear to auscultation               Abdominal  GI exam deferred       Other Findings            Anesthetic Plan    ASA: 2  Anesthesia type: total IV anesthesia          Induction: Intravenous  Anesthetic plan and risks discussed with: Patient

## 2021-10-29 NOTE — DISCHARGE INSTRUCTIONS
Harjeet Handler  [id number]  1950    COLON DISCHARGE INSTRUCTIONS  Discomfort:  Redness at IV site- apply warm compress to area; if redness or soreness persist- contact your physician  There may be a slight amount of blood passed from the rectum  Gaseous discomfort- walking, belching will help relieve any discomfort  You may not operate a vehicle for 12 hours  You may not engage in an occupation involving machinery or appliances for rest of today  You may not drink alcoholic beverages for at least 12 hours  Avoid making any critical decisions for at least 24 hour  DIET:   Regular diet. - however -  remember your colon is empty and a heavy meal will produce gas. Avoid these foods:  vegetables, fried / greasy foods, carbonated drinks for today. MEDICATIONS:        Regarding Aspirin or Nonsteroidal medications, please see below. ACTIVITY:  You may resume your normal daily activities it is recommended that you spend the remainder of the day resting -  avoid any strenuous activity. CALL M.D. ANY SIGN OF:  Increasing pain, nausea, vomiting  Abdominal distension (swelling)  New increased bleeding (oral or rectal)  Fever (chills)  Pain in chest area  Bloody discharge from nose or mouth  Shortness of breath    Tylenol as needed for pain.       Follow-up Instructions:   Call Dr. Franky Rajput for questions about procedure at telephone #  [phone]              Repeat Colonoscopy in 1 year

## 2021-10-29 NOTE — ANESTHESIA POSTPROCEDURE EVALUATION
Procedure(s):  REPEAT COLONOSCOPY TO CECILY POLYPECTOPMY SITE WITH ROBERT INK (URGENT) needs rapid covid  INJECTION. total IV anesthesia    Anesthesia Post Evaluation        Patient location during evaluation: PACU  Note status: Adequate. Level of consciousness: responsive to verbal stimuli and sleepy but conscious  Pain management: satisfactory to patient  Airway patency: patent  Anesthetic complications: no  Cardiovascular status: acceptable  Respiratory status: acceptable  Hydration status: acceptable  Comments: +Post-Anesthesia Evaluation and Assessment    Patient: Chandler Valderrama MRN: [id number]  SSN: [id number]   YOB: 1950  Age: 66 y.o. Sex: female      Cardiovascular Function/Vital Signs    BP (!) 137/47   Pulse 68   Temp 36.6 °C (97.9 °F)   Resp 21   Ht 5' 5\" (1.651 m)   Wt 85.8 kg (189 lb 1.6 oz)   SpO2 100%   Breastfeeding No   BMI 31.47 kg/m²     Patient is status post Procedure(s):  REPEAT COLONOSCOPY TO CECILY POLYPECTOPMY SITE WITH ROBERT INK (URGENT) needs rapid covid  INJECTION. Nausea/Vomiting: Controlled. Postoperative hydration reviewed and adequate. Pain:  Pain Scale 1: Numeric (0 - 10) (10/29/21 1347)  Pain Intensity 1: 0 (10/29/21 1347)   Managed. Neurological Status: At baseline. Mental Status and Level of Consciousness: Arousable. Pulmonary Status:   O2 Device: CO2 nasal cannula (10/29/21 1432)   Adequate oxygenation and airway patent. Complications related to anesthesia: None    Post-anesthesia assessment completed. No concerns. Signed By: Vannesa Langford MD    10/29/2021  Post anesthesia nausea and vomiting:  controlled      INITIAL Post-op Vital signs: No vitals data found for the desired time range.

## 2021-10-29 NOTE — ROUTINE PROCESS
[facility]  1950  [id number]    Situation:  Verbal report received from: Jelly Schaefer  Procedure: Procedure(s):  REPEAT COLONOSCOPY TO [address] [place] [address] (URGENT) needs rapid covid  INJECTION    Background:    Preoperative diagnosis: COLON POLYPS  Postoperative diagnosis: Diverticulosis, Polypectomy site with [address] injection    :  Dr. Lexi Berrios  Assistant(s): Endoscopy Technician-1: Michele Pacheco  Endoscopy RN-1: Danette Low RN    Specimens: * No specimens in log *  H. Pylori  no    Assessment:  Intra-procedure medications     Anesthesia gave intra-procedure sedation and medications, see anesthesia flow sheet yes    Intravenous fluids: NS@ KVO     Vital signs stable     Abdominal assessment: round and soft     Recommendation:  Discharge patient per MD order  Family   Permission to share finding with family or friend yes    Endoscopy discharge instructions have been reviewed and given to patient. The patient verbalized understanding and acceptance of instructions.

## 2021-10-29 NOTE — PROCEDURES
Colonoscopy Procedure Note    Indications:   Colon polyp with low grade dysplasia    Referring Physician: Ana Lilia Bettencourt MD  Anesthesia/Sedation: MAC anesthesia Propofol  Endoscopist:  Dr. Beatrice Morel    Procedure in Detail:  Informed consent was obtained for the procedure, including sedation. Risks of perforation, hemorrhage, adverse drug reaction, and aspiration were discussed. The patient was placed in the left lateral decubitus position. Based on the pre-procedure assessment, including review of the patient's medical history, medications, allergies, and review of systems, she had been deemed to be an appropriate candidate for moderate sedation; she was therefore sedated with the medications listed above. The patient was monitored continuously with ECG tracing, pulse oximetry, blood pressure monitoring, and direct observations. A rectal examination was performed. The [id number] was inserted into the rectum and advanced under direct vision to the cecum, which was identified by the ileocecal valve and appendiceal orifice. The quality of the colonic preparation was adequate. A careful inspection was made as the colonoscope was withdrawn, including a retroflexed view of the rectum; findings and interventions are described below. Appropriate photodocumentation was obtained. Findings:     1. Scope advanced to the cecum. 2.  Preparation was adequate. 3.  Some melanosis coli changes in the proximal colon. 4.  ++ Hemoclip attached at the proximal/mid ascending colon 2 folds distal to cecum with ulceration (polypectomy scarring noted). No polyp tissue seen. We injected total of 8 ml of evin ink to ciaran the polypectomy site successfully. 5.  Moderate diverticulosis. Therapies:  See above    Specimen: Specimens were collected as described above and sent to pathology. Complications: None were encountered during the procedure. EBL: < 10 ml.     Recommendations:     - Repeat colonoscopy in 1 year.     Signed By: Jessica Boss MD                        October 29, 2021

## 2022-03-18 PROBLEM — J45.909 ASTHMA: Status: ACTIVE | Noted: 2018-09-03

## 2022-03-18 PROBLEM — S72.309A FRACTURE, FEMUR CLOSED, SHAFT (HCC): Status: ACTIVE | Noted: 2018-09-03

## 2022-03-19 PROBLEM — M81.0 OSTEOPOROSIS: Status: ACTIVE | Noted: 2018-09-03

## 2022-03-19 PROBLEM — E78.5 HYPERLIPIDEMIA: Status: ACTIVE | Noted: 2018-09-03

## 2022-11-03 ENCOUNTER — ANESTHESIA EVENT (OUTPATIENT)
Dept: ENDOSCOPY | Age: 72
End: 2022-11-03
Payer: MEDICARE

## 2022-11-03 NOTE — ANESTHESIA PREPROCEDURE EVALUATION
Anesthetic History   No history of anesthetic complications            Review of Systems / Medical History  Patient summary reviewed, nursing notes reviewed and pertinent labs reviewed    Pulmonary            Asthma : well controlled       Neuro/Psych   Within defined limits           Cardiovascular              Hyperlipidemia    Exercise tolerance: >4 METS     GI/Hepatic/Renal  Within defined limits             Comments: Colon polyps Endo/Other        Obesity, cancer (skin cancer) and anemia     Other Findings   Comments: Osteoporosis  Hx skin ca         Physical Exam    Airway  Mallampati: III  TM Distance: 4 - 6 cm  Neck ROM: normal range of motion   Mouth opening: Diminished (comment)     Cardiovascular    Rhythm: regular  Rate: normal    Murmur: Grade 1     Dental    Dentition: Lower dentition intact, Upper dentition intact and Caps/crowns  Comments: Overbite   Pulmonary  Breath sounds clear to auscultation               Abdominal  GI exam deferred       Other Findings            Anesthetic Plan    ASA: 2  Anesthesia type: total IV anesthesia          Induction: Intravenous  Anesthetic plan and risks discussed with: Patient

## 2022-11-04 ENCOUNTER — ANESTHESIA (OUTPATIENT)
Dept: ENDOSCOPY | Age: 72
End: 2022-11-04
Payer: MEDICARE

## 2022-11-04 ENCOUNTER — HOSPITAL ENCOUNTER (OUTPATIENT)
Age: 72
Setting detail: OUTPATIENT SURGERY
Discharge: HOME | End: 2022-11-04
Attending: SPECIALIST | Admitting: SPECIALIST
Payer: MEDICARE

## 2022-11-04 VITALS
DIASTOLIC BLOOD PRESSURE: 53 MMHG | TEMPERATURE: 98 F | RESPIRATION RATE: 16 BRPM | HEIGHT: 66 IN | BODY MASS INDEX: 31.12 KG/M2 | HEART RATE: 62 BPM | WEIGHT: 193.6 LBS | SYSTOLIC BLOOD PRESSURE: 129 MMHG | OXYGEN SATURATION: 98 %

## 2022-11-04 PROCEDURE — 74011000250 HC RX REV CODE- 250: Performed by: ANESTHESIOLOGY

## 2022-11-04 PROCEDURE — 76060000031 HC ANESTHESIA FIRST 0.5 HR: Performed by: SPECIALIST

## 2022-11-04 PROCEDURE — 74011250636 HC RX REV CODE- 250/636

## 2022-11-04 PROCEDURE — 74011250636 HC RX REV CODE- 250/636: Performed by: SPECIALIST

## 2022-11-04 PROCEDURE — 2709999900 HC NON-CHARGEABLE SUPPLY: Performed by: SPECIALIST

## 2022-11-04 PROCEDURE — 74011250636 HC RX REV CODE- 250/636: Performed by: ANESTHESIOLOGY

## 2022-11-04 PROCEDURE — 76040000019: Performed by: SPECIALIST

## 2022-11-04 RX ORDER — SODIUM CHLORIDE 0.9 % (FLUSH) 0.9 %
5-40 SYRINGE (ML) INJECTION EVERY 8 HOURS
Status: DISCONTINUED | OUTPATIENT
Start: 2022-11-04 | End: 2022-11-04 | Stop reason: HOSPADM

## 2022-11-04 RX ORDER — ONDANSETRON 2 MG/ML
INJECTION INTRAMUSCULAR; INTRAVENOUS
Status: COMPLETED
Start: 2022-11-04 | End: 2022-11-04

## 2022-11-04 RX ORDER — LIDOCAINE HYDROCHLORIDE 20 MG/ML
INJECTION, SOLUTION EPIDURAL; INFILTRATION; INTRACAUDAL; PERINEURAL AS NEEDED
Status: DISCONTINUED | OUTPATIENT
Start: 2022-11-04 | End: 2022-11-04 | Stop reason: HOSPADM

## 2022-11-04 RX ORDER — DEXTROMETHORPHAN/PSEUDOEPHED 2.5-7.5/.8
1.2 DROPS ORAL
Status: DISCONTINUED | OUTPATIENT
Start: 2022-11-04 | End: 2022-11-04 | Stop reason: HOSPADM

## 2022-11-04 RX ORDER — ONDANSETRON 2 MG/ML
4-8 INJECTION INTRAMUSCULAR; INTRAVENOUS ONCE
Status: DISCONTINUED | OUTPATIENT
Start: 2022-11-04 | End: 2022-11-04 | Stop reason: HOSPADM

## 2022-11-04 RX ORDER — SODIUM CHLORIDE 0.9 % (FLUSH) 0.9 %
5-40 SYRINGE (ML) INJECTION AS NEEDED
Status: DISCONTINUED | OUTPATIENT
Start: 2022-11-04 | End: 2022-11-04 | Stop reason: HOSPADM

## 2022-11-04 RX ORDER — ONDANSETRON 2 MG/ML
INJECTION INTRAMUSCULAR; INTRAVENOUS AS NEEDED
Status: DISCONTINUED | OUTPATIENT
Start: 2022-11-04 | End: 2022-11-04 | Stop reason: HOSPADM

## 2022-11-04 RX ORDER — SODIUM CHLORIDE 9 MG/ML
50 INJECTION, SOLUTION INTRAVENOUS CONTINUOUS
Status: DISCONTINUED | OUTPATIENT
Start: 2022-11-04 | End: 2022-11-04 | Stop reason: HOSPADM

## 2022-11-04 RX ORDER — PROPOFOL 10 MG/ML
INJECTION, EMULSION INTRAVENOUS AS NEEDED
Status: DISCONTINUED | OUTPATIENT
Start: 2022-11-04 | End: 2022-11-04 | Stop reason: HOSPADM

## 2022-11-04 RX ADMIN — PROPOFOL 220 MG: 10 INJECTION, EMULSION INTRAVENOUS at 09:43

## 2022-11-04 RX ADMIN — SODIUM CHLORIDE 50 ML/HR: 9 INJECTION, SOLUTION INTRAVENOUS at 08:51

## 2022-11-04 RX ADMIN — ONDANSETRON 4 MG: 2 INJECTION INTRAMUSCULAR; INTRAVENOUS at 09:39

## 2022-11-04 RX ADMIN — ONDANSETRON HYDROCHLORIDE 4 MG: 2 INJECTION, SOLUTION INTRAMUSCULAR; INTRAVENOUS at 09:38

## 2022-11-04 RX ADMIN — LIDOCAINE HYDROCHLORIDE 40 MG: 20 INJECTION, SOLUTION EPIDURAL; INFILTRATION; INTRACAUDAL; PERINEURAL at 09:30

## 2022-11-04 NOTE — ANESTHESIA POSTPROCEDURE EVALUATION
Procedure(s):  COLONOSCOPY.    total IV anesthesia    Anesthesia Post Evaluation        Patient location during evaluation: PACU  Note status: Adequate. Level of consciousness: responsive to verbal stimuli and sleepy but conscious  Pain management: satisfactory to patient  Airway patency: patent  Anesthetic complications: no  Cardiovascular status: acceptable  Respiratory status: acceptable  Hydration status: acceptable  Comments: +Post-Anesthesia Evaluation and Assessment    Patient: Shannon Staton MRN: [id number]  SSN: [id number]   YOB: 1950  Age: 67 y.o. Sex: female      Cardiovascular Function/Vital Signs    BP (!) 129/53   Pulse 62   Temp 36.7 °C (98 °F)   Resp 16   Ht 5' 6\" (1.676 m)   Wt 87.8 kg (193 lb 9.6 oz)   SpO2 98%   Breastfeeding No   BMI 31.25 kg/m²     Patient is status post Procedure(s):  COLONOSCOPY. Nausea/Vomiting: Controlled. Postoperative hydration reviewed and adequate. Pain:  Pain Scale 1: Numeric (0 - 10) (11/04/22 1009)  Pain Intensity 1: 0 (11/04/22 1009)   Managed. Neurological Status: At baseline. Mental Status and Level of Consciousness: Arousable. Pulmonary Status:   O2 Device: None (Room air) (11/04/22 1009)   Adequate oxygenation and airway patent. Complications related to anesthesia: None    Post-anesthesia assessment completed. No concerns. Signed By: Yael Roberto DO    11/4/2022  Post anesthesia nausea and vomiting:  controlled      INITIAL Post-op Vital signs:   Vitals Value Taken Time   /53 11/04/22 1009   Temp 36.7 °C (98 °F) 11/04/22 0954   Pulse 64 11/04/22 1009   Resp 19 11/04/22 1009   SpO2 98 % 11/04/22 1009   Vitals shown include unvalidated device data.

## 2022-11-04 NOTE — ROUTINE PROCESS
Svetlana Hughes  1950  [id number]    Situation:  Verbal report received from: Maribel Davis  Procedure: Procedure(s):  COLONOSCOPY    Background:    Preoperative diagnosis: Family history of malignant neoplasm of gastrointestinal tract [Z80.0]  Personal history of colonic polyps [Z86.010]  Postoperative diagnosis: Diverticulosis     :  Dr. Rodríguez Lance  Assistant(s): Endoscopy Technician-1: Lindsay Romero  Endoscopy RN-1: Keyana Mcdoonugh    Specimens: * No specimens in log *  H. Pylori  no    Assessment:  Intra-procedure medications     Anesthesia gave intra-procedure sedation and medications, see anesthesia flow sheet yes    Intravenous fluids: NS@ KVO     Vital signs stable     Abdominal assessment: round and soft     Recommendation:  Discharge patient per MD order.   Family   Permission to share finding with family or friend yes

## 2022-11-04 NOTE — H&P
Gastroenterology Outpatient History and Physical    Patient: Austyn Demarco    Physician: Lucia Medina MD    Vital Signs: Blood pressure 134/61, pulse 68, temperature 98.1 °F (36.7 °C), resp. rate 15, height 5' 6\" (1.676 m), weight 87.8 kg (193 lb 9.6 oz), SpO2 99 %, not currently breastfeeding. Allergies: No Known Allergies    Chief Complaint: H/O Polyp with low grade dysplasia; FH colon ca    History of Present Illness: above    Justification for Procedure: above    History:  Past Medical History:   Diagnosis Date    Asthma     Cancer ([facility] [address]     skin    Environmental and seasonal allergies     Osteoporosis       Past Surgical History:   Procedure Laterality Date    COLONOSCOPY N/A 7/26/2016    COLONOSCOPY performed by Lucia Medina MD at [facility] ENDOSCOPY    COLONOSCOPY N/A 10/22/2021    COLONOSCOPY performed by Mary Lyman MD at [facility] ENDOSCOPY    COLONOSCOPY N/A 10/29/2021    REPEAT COLONOSCOPY TO CECILY POLYPECTOPMY SITE WITH ROBERT INK (URGENT) needs rapid covid performed by Mary Lyman MD at [facility] ENDOSCOPY    HX ORTHOPAEDIC Left 2018    fx femur, titanium bryon    HX OTHER SURGICAL      exc skin ca      Social History     Socioeconomic History    Marital status: SINGLE   Tobacco Use    Smoking status: Never    Smokeless tobacco: Never   Vaping Use    Vaping Use: Never used   Substance and Sexual Activity    Alcohol use: No    Drug use: No      Family History   Problem Relation Age of Onset    Cancer Mother         colon     Heart Disease Father        Medications:   Prior to Admission medications    Medication Sig Start Date End Date Taking? Authorizing Provider   levocetirizine (XYZAL) 5 mg tablet Take 5 mg by mouth daily. Yes Provider, Historical   vitamin e (E GEMS) 1,000 unit capsule Take 1,000 Units by mouth daily. Yes Provider, Historical   polyethylene glycol (MIRALAX) 17 gram packet Take 17 g by mouth nightly.    Yes Provider, Historical   fluticasone furoate-vilanteroL (BREO ELLIPTA) 100-25 mcg/dose inhaler Take 1 Puff by inhalation daily. Yes Other, MD Karan   montelukast (SINGULAIR) 10 mg tablet Take 10 mg by mouth daily. Yes Provider, Historical   simvastatin (ZOCOR) 20 mg tablet Take  by mouth nightly. Yes Provider, Historical   mometasone (NASONEX) 50 mcg/actuation nasal spray 2 Sprays by Both Nostrils route daily. Yes Provider, Historical   cholecalciferol (VITAMIN D3) 1,000 unit tablet Take  by mouth daily. Yes Provider, Historical   multivitamin (ONE A DAY) tablet Take 1 Tab by mouth daily. Yes Provider, Historical   calcium-cholecalciferol, d3, 600-125 mg-unit tab Take 1 Tablet by mouth daily. Yes Provider, Historical   denosumab (Prolia) 60 mg/mL injection 60 mg by SubCUTAneous route every 6 months. Provider, Historical       Physical Exam:   General: alert, no distress   HEENT: Head: Normocephalic, no lesions, without obvious abnormality.    Heart: regular rate and rhythm, S1, S2 normal, no murmur, click, rub or gallop   Lungs: chest clear, no wheezing, rales, normal symmetric air entry   Abdominal: soft, NT/ND+ BS   Neurological: Grossly normal   Extremities: extremities normal, atraumatic, no cyanosis or edema     Findings/Diagnosis: FH Colon CA; H/O Polyp with low grade dysplasia    Plan of Care/Planned Procedure: Colonoscopy

## 2022-11-04 NOTE — DISCHARGE INSTRUCTIONS
Barbara Pérez  [id number]  1950    COLON DISCHARGE INSTRUCTIONS  Discomfort:  Redness at IV site- apply warm compress to area; if redness or soreness persist- contact your physician  There may be a slight amount of blood passed from the rectum  Gaseous discomfort- walking, belching will help relieve any discomfort  You may not operate a vehicle for 12 hours  You may not engage in an occupation involving machinery or appliances for rest of today  You may not drink alcoholic beverages for at least 12 hours  Avoid making any critical decisions for at least 24 hour  DIET:   Regular diet. - however -  remember your colon is empty and a heavy meal will produce gas. Avoid these foods:  vegetables, fried / greasy foods, carbonated drinks for today. MEDICATIONS:        Regarding Aspirin or Nonsteroidal medications, please see below. ACTIVITY:  You may resume your normal daily activities it is recommended that you spend the remainder of the day resting -  avoid any strenuous activity. CALL M.D. ANY SIGN OF:  Increasing pain, nausea, vomiting  Abdominal distension (swelling)  New increased bleeding (oral or rectal)  Fever (chills)  Pain in chest area  Bloody discharge from nose or mouth  Shortness of breath  \Tylenol as needed for pain.       Follow-up Instructions:   Call Dr. Lazaro Chase for questions about procedure at telephone #  [phone]              Repeat Colonoscopy in 3 years    Patient Education on Sedation / Analgesia Administered for Procedure      For 24 hours after general anesthesia or intravenous analgesia / sedation:  Have someone responsible help you with your care  Limit your activities  Do not drive and operate hazardous machinery  Do not make important personal, legal or business decisions  Do not drink alcoholic beverages  If you have not urinated within 8 hours after discharge, please contact your physician  Resume your medications unless otherwise instructed    For 24 hours after general anesthesia or intravenous analgesia / sedation  you may experience:  Drowsiness, dizziness, sleepiness, or confusion  Difficulty remembering or delayed reaction times  Difficulty with your balance, especially while walking, move slowly and carefully, do not make sudden position changes  Difficulty focusing or blurred vision    You may not be aware of slight changes in your behavior and/or your reaction time because of the medication used during and after your procedure. Report the following to your physician:  Excessive pain, swelling, redness or odor of or around the surgical area  Temperature over 100.5  Nausea and vomiting lasting longer than 4 hours or if unable to take medications  Any signs of decreased circulation or nerve impairment to extremity: change in color, persistent numbness, tingling, coldness or increase pain  Any questions or concerns    IF YOU REPORT TO AN EMERGENCY ROOM, DOCTOR'S OFFICE OR HOSPITAL WITHIN 24 HOURS AFTER YOUR PROCEDURE, BRING THIS SHEET AND YOUR AFTER VISIT SUMMARY WITH YOU AND GIVE IT TO THE PHYSICIAN OR NURSE ATTENDING YOU. Learning About Diverticulosis and Diverticulitis  What are diverticulosis and diverticulitis? In diverticulosis and diverticulitis, pouches called diverticula form in the wall of the large intestine, or colon. In diverticulosis, the pouches do not cause any pain or other symptoms. In diverticulitis, the pouches get inflamed or infected and cause symptoms. Doctors aren't sure what causes these pouches in the colon. But they think that a low-fiber diet may play a role. Without fiber to add bulk to the stool, the colon has to work harder than normal to push the stool forward. The pressure from this may cause pouches to form in weak spots along the colon. Some people with diverticulosis get diverticulitis. But experts don't know why this happens. What are the symptoms? In diverticulosis, most people don't have symptoms.  But pouches sometimes bleed. In diverticulitis, symptoms may last from a few hours to a week or more. They include:  Belly pain. This is usually in the lower left side. It is sometimes worse when you move. This is the most common symptom. Fever and chills. Bloating and gas. Diarrhea or constipation. Nausea and sometimes vomiting. Not feeling like eating. How can you prevent diverticulitis? You may be able to lower your chance of getting diverticulitis. You can do this by taking steps to prevent constipation. Eat fruits, vegetables, beans, and whole grains every day. These foods are high in fiber. Drink plenty of fluids. If you have kidney, heart, or liver disease and have to limit fluids, talk with your doctor before you increase the amount of fluids you drink. Get at least 30 minutes of exercise on most days of the week. Walking is a good choice. You also may want to do other activities, such as running, swimming, cycling, or playing tennis or team sports. Take a fiber supplement, such as Citrucel or Metamucil, every day if needed. Read and follow all instructions on the label. Schedule time each day for a bowel movement. Having a daily routine may help. Take your time and do not strain when having a bowel movement. Some people avoid nuts, seeds, berries, and popcorn. They believe that these foods might get trapped in the diverticula and cause pain. But there is no proof that these foods cause diverticulitis or make it worse. How are these problems treated? The best way to treat diverticulosis is to avoid constipation. Treatment for diverticulitis includes antibiotics. It often includes a change in your diet. You may need only liquids at first. Your doctor may suggest pain medicines for pain or belly cramps. In some cases, surgery may be needed. Follow-up care is a key part of your treatment and safety. Be sure to make and go to all appointments, and call your doctor if you are having problems.  It's also a good idea to know your test results and keep a list of the medicines you take. Where can you learn more? Go to http://www.gray.com/  Enter B252 in the search box to learn more about \"Learning About Diverticulosis and Diverticulitis. \"  Current as of: June 6, 2022               Content Version: 13.4  © 7112-0593 [employer]. Care instructions adapted under license by [employer] (which disclaims liability or warranty for this information). If you have questions about a medical condition or this instruction, always ask your healthcare professional. [place] [address] any warranty or liability for your use of this information.

## 2022-11-04 NOTE — PROCEDURES
Colonoscopy Procedure Note    Indications:   Family history of coloretal cancer (screening only), Personal history of colon polyps (screening only)    Referring Physician: Cr Ceron MD  Anesthesia/Sedation: MAC anesthesia Propofol  Endoscopist:  Dr. Holly Hightower    Procedure in Detail:  Informed consent was obtained for the procedure, including sedation. Risks of perforation, hemorrhage, adverse drug reaction, and aspiration were discussed. The patient was placed in the left lateral decubitus position. Based on the pre-procedure assessment, including review of the patient's medical history, medications, allergies, and review of systems, she had been deemed to be an appropriate candidate for moderate sedation; she was therefore sedated with the medications listed above. The patient was monitored continuously with ECG tracing, pulse oximetry, blood pressure monitoring, and direct observations. A rectal examination was performed. The [id number] was inserted into the rectum and advanced under direct vision to the cecum, which was identified by the ileocecal valve and appendiceal orifice. The quality of the colonic preparation was adequate. A careful inspection was made as the colonoscope was withdrawn, including a retroflexed view of the rectum; findings and interventions are described below. Appropriate photodocumentation was obtained. Findings:    Scope advanced to the cecum. 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 There was a tattoo site seen in the proximal ascending colon without any tissue growth. Scattered left diverticulosis. Therapies:  none    Specimen: Specimens were collected as described above and sent to pathology. Complications: None were encountered during the procedure. EBL: < 10 ml.     Recommendations:     -Repeat Colonoscopy in 3 years    Signed By: Geovanna Mast MD                        November 4, 2022

## 2022-11-04 NOTE — PERIOP NOTES
6654: See anesthesia note and MAR for medications given during procedure. Received report from anesthesia staff on vital signs and status of patient.      Endoscope was pre-cleaned at the bedside immediately following procedure by FARAZ Lee

## (undated) DEVICE — DRAPE SHT 3 QTR PROXIMA 53X77 --

## (undated) DEVICE — CATH IV AUTOGRD BC PNK 20GA 25 -- INSYTE

## (undated) DEVICE — KENDALL SCD EXPRESS SLEEVES, KNEE LENGTH, MEDIUM: Brand: KENDALL SCD

## (undated) DEVICE — NON-REM POLYHESIVE PATIENT RETURN ELECTRODE: Brand: VALLEYLAB

## (undated) DEVICE — NEEDLE HYPO 18GA L1.5IN PNK S STL HUB POLYPR SHLD REG BVL

## (undated) DEVICE — SYR 10ML LUER LOK 1/5ML GRAD --

## (undated) DEVICE — BIT DRL L145MM DIA3.2MM 3 FLUT QUIK CPL FOR EXPERT TIB

## (undated) DEVICE — Z DISCONTINUED PER MEDLINE LINE GAS SAMPLING O2/CO2 LNG AD 13 FT NSL W/ TBNG FILTERLINE

## (undated) DEVICE — SET ADMIN 16ML TBNG L100IN 2 Y INJ SITE IV PIGGY BK DISP

## (undated) DEVICE — TOWEL 4 PLY TISS 19X30 SUE WHT

## (undated) DEVICE — STERILE POLYISOPRENE POWDER-FREE SURGICAL GLOVES: Brand: PROTEXIS

## (undated) DEVICE — SUTURE VCRL SZ 0 L27IN ABSRB VLT L36MM CT-1 1/2 CIR J340H

## (undated) DEVICE — 1200 GUARD II KIT W/5MM TUBE W/O VAC TUBE: Brand: GUARDIAN

## (undated) DEVICE — 6619 2 PTNT ISO SYS INCISE AREA&LT;(&GT;&&LT;)&GT;P: Brand: STERI-DRAPE™ IOBAN™ 2

## (undated) DEVICE — REM POLYHESIVE ADULT PATIENT RETURN ELECTRODE: Brand: VALLEYLAB

## (undated) DEVICE — GOWN,SIRUS,NONRNF,SETINSLV,XL,20/CS: Brand: MEDLINE

## (undated) DEVICE — SOLIDIFIER FLD 2OZ 1500CC N DISINF IN BTL DISP SAFESORB

## (undated) DEVICE — NEEDLE SCLERO 25GA L240CM OD0.51MM ID0.24MM EXTN L4MM SHTH

## (undated) DEVICE — ROCKER SWITCH PENCIL BLADE ELECTRODE, HOLSTER: Brand: EDGE

## (undated) DEVICE — Device

## (undated) DEVICE — COVER,TABLE,60X90,STERILE: Brand: MEDLINE

## (undated) DEVICE — BIT DRL L145MM DIA4.2MM NONSTERILE 3 FLUT NDL PNT QUIK CPL

## (undated) DEVICE — STRAINER URIN CALC RNL MSH -- CONVERT TO ITEM 357634

## (undated) DEVICE — (D)AGENT INJECTN ELEVIEW 10ML -- DISC BY MFG

## (undated) DEVICE — HANDLE LT SNAP ON ULT DURABLE LENS FOR TRUMPF ALC DISPOSABLE

## (undated) DEVICE — BASIN EMSIS 16OZ GRAPHITE PLAS KID SHP MOLD GRAD FOR ORAL

## (undated) DEVICE — BANDAGE COMPR SELF ADH 5 YDX6 IN TAN STRL PREMIERPRO LF

## (undated) DEVICE — PACK,SHOULDER II,DRAPE: Brand: MEDLINE

## (undated) DEVICE — C-ARMOR C-ARM EQUIPMENT COVERS CLEAR STERILE UNIVERSAL FIT 12 PER CASE: Brand: C-ARMOR

## (undated) DEVICE — ELECTRODE,RADIOTRANSLUCENT,FOAM,5PK: Brand: MEDLINE

## (undated) DEVICE — ROD RMR L950MM DIA2.5MM W/ EXTN BALL TIP

## (undated) DEVICE — SOLUTION IV 1000ML 0.9% SOD CHL

## (undated) DEVICE — SYR 3ML LL TIP 1/10ML GRAD --

## (undated) DEVICE — TRAP,MUCUS SPECIMEN, 80CC: Brand: MEDLINE

## (undated) DEVICE — MEDI-VAC YANK SUCT HNDL W/TPRD BULBOUS TIP & NON-CONDUCTIVE: Brand: CARDINAL HEALTH

## (undated) DEVICE — HYPODERMIC SAFETY NEEDLE: Brand: MAGELLAN

## (undated) DEVICE — 4.2MM THREE-FLUTED DRILL BIT QC/330MM/100MM CALIBRATION

## (undated) DEVICE — (D)PREP SKN CHLRAPRP APPL 26ML -- CONVERT TO ITEM 371833

## (undated) DEVICE — SET ADMIN 16ML TBNG L100IN 2 Y INJ SITE IV PIGGY BK DISP (ORDER IN MULIPLES OF 48)

## (undated) DEVICE — SLIM BODY SKIN STAPLER: Brand: APPOSE ULC

## (undated) DEVICE — CONTAINER SPEC 20 ML LID NEUT BUFF FORMALIN 10 % POLYPR STS

## (undated) DEVICE — GUIDEWIRE ORTH L290MM DIA3.2MM FOR RG AG EXPERT FEM NAILING

## (undated) DEVICE — CONVERTORS STOCKINETTE: Brand: CONVERTORS

## (undated) DEVICE — STAIN INDIA INK IN NACL 10ML --

## (undated) DEVICE — NEONATAL-ADULT SPO2 SENSOR: Brand: NELLCOR

## (undated) DEVICE — SUTURE VCRL SZ 2-0 L36IN ABSRB VLT L36MM CT-1 1/2 CIR J345H

## (undated) DEVICE — YANKAUER BULB TIP, NO VENT: Brand: ARGYLE

## (undated) DEVICE — WATERPROOF, BACTERIA PROOF DRESSING WITH ABSORBENT SEE THROUGH PAD: Brand: OPSITE POST-OP VISIBLE 10X8CM CTN 20

## (undated) DEVICE — (D)SOL MEDC ALC ISO 70% 16OZ -- CONVERT TO ITEM 364515

## (undated) DEVICE — SNARE ENDOSCP M L240CM W27MM SHTH DIA2.4MM CHN 2.8MM OVL

## (undated) DEVICE — SUTURE VCRL SZ 1 L36IN ABSRB UD L36MM CT-1 1/2 CIR J947H

## (undated) DEVICE — SURGICAL PROCEDURE PACK BASIN MAJ SET CUST NO CAUT